# Patient Record
Sex: FEMALE | Race: WHITE | HISPANIC OR LATINO | Employment: FULL TIME | URBAN - METROPOLITAN AREA
[De-identification: names, ages, dates, MRNs, and addresses within clinical notes are randomized per-mention and may not be internally consistent; named-entity substitution may affect disease eponyms.]

---

## 2017-06-19 ENCOUNTER — ALLSCRIPTS OFFICE VISIT (OUTPATIENT)
Dept: OTHER | Facility: OTHER | Age: 42
End: 2017-06-19

## 2017-06-23 ENCOUNTER — ALLSCRIPTS OFFICE VISIT (OUTPATIENT)
Dept: OTHER | Facility: OTHER | Age: 42
End: 2017-06-23

## 2017-06-23 DIAGNOSIS — E55.9 VITAMIN D DEFICIENCY: ICD-10-CM

## 2017-06-23 DIAGNOSIS — Z00.00 ENCOUNTER FOR GENERAL ADULT MEDICAL EXAMINATION WITHOUT ABNORMAL FINDINGS: ICD-10-CM

## 2017-06-23 DIAGNOSIS — Z12.31 ENCOUNTER FOR SCREENING MAMMOGRAM FOR MALIGNANT NEOPLASM OF BREAST: ICD-10-CM

## 2017-08-23 DIAGNOSIS — N64.59 OTHER SIGN AND SYMPTOM IN BREAST: ICD-10-CM

## 2017-08-23 DIAGNOSIS — Z12.31 ENCOUNTER FOR SCREENING MAMMOGRAM FOR MALIGNANT NEOPLASM OF BREAST: ICD-10-CM

## 2017-08-23 DIAGNOSIS — Z11.3 ENCOUNTER FOR SCREENING FOR INFECTIONS WITH PREDOMINANTLY SEXUAL MODE OF TRANSMISSION: ICD-10-CM

## 2017-08-25 ENCOUNTER — HOSPITAL ENCOUNTER (OUTPATIENT)
Dept: MAMMOGRAPHY | Facility: CLINIC | Age: 42
Discharge: HOME | End: 2017-08-25

## 2017-08-25 ENCOUNTER — HOSPITAL ENCOUNTER (OUTPATIENT)
Dept: ULTRASOUND IMAGING | Facility: CLINIC | Age: 42
Discharge: HOME | End: 2017-08-25

## 2017-08-25 DIAGNOSIS — N64.59 OTHER SIGN AND SYMPTOM IN BREAST: ICD-10-CM

## 2017-08-25 DIAGNOSIS — Z12.31 ENCOUNTER FOR SCREENING MAMMOGRAM FOR MALIGNANT NEOPLASM OF BREAST: ICD-10-CM

## 2017-08-25 DIAGNOSIS — N63.0 BREAST LUMP: ICD-10-CM

## 2017-08-25 PROCEDURE — G0279 TOMOSYNTHESIS, MAMMO: HCPCS

## 2017-08-25 PROCEDURE — G0204 DX MAMMO INCL CAD BI: HCPCS

## 2017-08-25 PROCEDURE — 76642 ULTRASOUND BREAST LIMITED: CPT

## 2017-08-29 ENCOUNTER — LAB REQUISITION (OUTPATIENT)
Dept: LAB | Facility: HOSPITAL | Age: 42
End: 2017-08-29

## 2017-08-29 ENCOUNTER — GENERIC CONVERSION - ENCOUNTER (OUTPATIENT)
Dept: OTHER | Facility: OTHER | Age: 42
End: 2017-08-29

## 2017-08-29 DIAGNOSIS — Z11.3 ENCOUNTER FOR SCREENING FOR INFECTIONS WITH PREDOMINANTLY SEXUAL MODE OF TRANSMISSION: ICD-10-CM

## 2017-08-29 PROCEDURE — 87591 N.GONORRHOEAE DNA AMP PROB: CPT | Performed by: STUDENT IN AN ORGANIZED HEALTH CARE EDUCATION/TRAINING PROGRAM

## 2017-08-29 PROCEDURE — 87491 CHLMYD TRACH DNA AMP PROBE: CPT | Performed by: STUDENT IN AN ORGANIZED HEALTH CARE EDUCATION/TRAINING PROGRAM

## 2017-08-30 LAB
CHLAMYDIA DNA CVX QL NAA+PROBE: NORMAL
N GONORRHOEA DNA GENITAL QL NAA+PROBE: NORMAL

## 2018-01-11 NOTE — MISCELLANEOUS
Message   Recorded as Task   Date: 01/30/2016 03:32 PM, Created By: System   Task Name: Verify External Doc   Assigned To: Yessi Francisco   Regarding Patient: Etta Bello, Status: Active   Comment:    System - 30 Jan 2016 3:32 PM     To: Martha Johnson    Recipient DirectID: [email or web address]    From:     Sender DirectID: [email or web address]  Rebekah Bullek - 01 Feb 2016 12:20 PM     TASK REASSIGNED: Previously Assigned To Lori Nichols - 01 Feb 2016 4:06 PM     TASK EDITED  Received call from Faith Nguyen, Ritika Mead  Scheduled patient for consultation with TB in the [facility] office on Tuesday, 2/2/2016  Patient aware of office location  Patient aware to arrive early for registration/paperwork  Patient aware to bring   Received insurance referral for [facility] [employer]  Entered into [employer]  Active Problems    1  Birth control (V25 9) (Z30 9)   2  Cholelithiasis with cholangitis (574 20,576 1) (K80 20,K83 0)   3  Dermatitis (692 9) (L30 9)   4  Encounter for routine gynecological examination with Papanicolaou smear of cervix   (V72 31,V76 2) (Z01 419,Z12 4)   5  Encounter for screening mammogram for malignant neoplasm of breast (V76 12)   (Z12 31)    Current Meds   1  Altavera 0 15-30 MG-MCG Oral Tablet; TAKE 1 TABLET DAILY  Requested for:   01DDG6055; Last Rx:57Qcg2456 Ordered   2  Altavera 0 15-30 MG-MCG Oral Tablet; TAKE 1 TABLET DAILY; Therapy: 34NND6126 to (Evaluate:57Cjj8065)  Requested for: 40FDR9131; Last   Rx:34Nmj1569 Ordered    Allergies    1   No Known Drug Allergies    Signatures   Electronically signed by : Leeanne Dominguez, ; Feb 1 2016  4:06PM EST                       (Author)

## 2018-01-11 NOTE — PROGRESS NOTES
Assessment    1  Encounter for preventive health examination (V70 0) (Z00 00)   2  Vitamin D deficiency (268 9) (E55 9)   3  Blood tests for routine general physical examination (V72 62) (Z00 00)   4  Cervical cancer screening (V76 2) (Z12 4)   5  Encounter for screening mammogram for breast cancer ( 12) (Z12 31)    Plan   Blood tests for routine general physical examination    · (1) CBC/PLT/DIFF; Status:Active; Requested [id number];    · (1) COMPREHENSIVE METABOLIC PANEL; Status:Active; Requested BBJ:56GRT1946;    · (1) LIPID PANEL FASTING W DIRECT LDL REFLEX; Status:Active; Requested  SAX:79JTF0827;    · (1) T4, FREE; Status:Active; Requested OWB:05LJD1921;    · (1) TSH; Status:Active; Requested V42ACU1774;    · (1) URINALYSIS w URINE C/S REFLEX (will reflex a microscopy if leukocytes, occult  blood, or nitrites are not within normal limits); Status:Active; Requested QF78MJE4901;   Depression screening    · *VB - PHQ-9 Tool; Status:Complete;   Done: 74SJP0119 10:10AM  Health Maintenance    · Call [phone] if: You find a new or different kind of lump in your breast ;  Status:Complete;   Done: 65FDN0099   · Call [phone] if: You have any warning signs of skin cancer ; Status:Complete;    Done: 98GFM0972   · Always use a seat belt and shoulder strap when riding or driving a motor vehicle ;  Status:Complete;   Done: 01IHO5470   · Begin a limited exercise program ; Status:Complete;   Done: 11IJF3737   · Begin or continue regular aerobic exercise   Gradually work up to at least 3 sessions of 30  minutes of exercise a week ; Status:Complete;   Done: 13TQG1739   · Brush your teeth 3 times a day and floss at least once a day ; Status:Complete;   Done:  06DBL2582   · Decreasing the stress in your life may help your condition improve ; Status:Complete;    Done: 53ITB3843   · Drink plenty of fluids ; Status:Complete;   Done: 36OOQ9609   · Eat a low fat and low cholesterol diet ; Status:Complete; Done: 69SUH0433   · Eat a normal well-balanced diet ; Status:Complete;   Done: 51KDE2362   · Keep a diary of when and what you eat ; Status:Complete;   Done: 87SSQ2065   · Regular aerobic exercise can help reduce stress ; Status:Complete;   Done: 97QXB9671   · Some eating tips that can help you lose weight ; Status:Complete;   Done: 27YWF2523   · Stretch and warm up your muscles during the first 10 minutes , then cool down your  muscles for the last 10 minutes of exercise ; Status:Complete;   Done: 15CSI5241   · There are ways to decrease your stress and improve your sense of well-being  We  encourage you to keep active and exercise regularly  Make time to take care of yourself  and participate in activities that you enjoy  Stay connected to friends and family that can  support and comfort you  If at any time you have thoughts of harming yourself or  someone else, contact us immediately ; Status:Active; Requested ITC:04WMH6899;    · Use a sun block product with an SPF of 15 or more ; Status:Complete;   Done:  61SAP1552   · Vitamins can help you get daily requirements that your diet may not be giving you ;  Status:Complete;   Done: 95LPT2583   · We encourage all of our patients to exercise regularly  30 minutes of exercise or physical  activity five or more days a week is recommended for children and adults ;  Status:Complete;   Done: 79YXJ8915   · We encourage you to begin to make lifestyle changes to help control your blood  pressure    These may include losing weight, increasing your activity level, limiting salt in  your diet, decreasing alcohol intake, and eating a diet low in fat and rich in fruits  and vegetables ; Status:Complete;   Done: 95MWE6246   · We recommend routine visits to a dentist ; Status:Complete;   Done: 55FTF1721   · We recommend that you bring your body mass index down to 26 ; Status:Complete;    Done: 59YOU7791   · We recommend that you follow these steps to lower your risk of osteoporosis  ;  Status:Complete;   Done: 12XXE3352   · We want you to follow the Therapeutic Lifestyle Changes (TLC) diet ; Status:Complete;    Done: 07VFY4932  Vitamin D deficiency    · (1) VITAMIN D 25-HYDROXY; Status:Active; Requested FMP:70YIB8267;     *1 - [facility]  *  Status: Hold For - Scheduling  Requested for: 07BNC6211  Ordered; For: Cervical cancer screening;  Ordered By: Neel Abreu  Performed:   Due: 64SGM3726  * MAMMO SCREENING BILATERAL W CAD; Status:Hold For - Scheduling; Requested NKX:91FFF8038;   Perform:[facility]; EVH:29KDH8286; Ordered;    For:Encounter for screening mammogram for breast cancer; Ordered By:Chhaya Lopez;      Discussion/Summary  health maintenance visit healthy adult female Currently, she eats a healthy diet, has an inadequate exercise regimen and Encouraged to increase exercise to 5 days per week  Due for annual exam in August  Breast cancer screening: monthly self breast exam was advised, mammogram has been ordered and the next mammogram is due 8/2017  Colorectal cancer screening: colorectal cancer screening is not indicated  Osteoporosis screening: bone mineral density testing is not indicated  Screening lab work includes hemoglobin, glucose, lipid profile, thyroid function testing, 25-hydroxyvitamin D and urinalysis  The patient declines immunizations  She was advised to be evaluated by an optometrist and a dentist  Advice and education were given regarding nutrition, aerobic exercise, weight bearing exercise, calcium supplements, vitamin D supplements, reproductive health and cardiovascular risk reduction  Patient discussion: discussed with the patient  Complete lab testing  Discussed increasing exercise, including wt bearing for osteoporosis prevention  Consider taking OTC calcium supplement as well    Check with insurance regarding participating eye doctors and schedule exam   Schedule annual gyn and mammogram    The patient was counseled regarding instructions for management, risk factor reductions, patient and family education, impressions  The treatment plan was reviewed with the patient/guardian  The patient/guardian understands and agrees with the treatment plan      Chief Complaint  44 Y/O Pt wants blood work done  pt wants a referral for eye doctor  History of Present Illness  HM, Adult Female: The patient is being seen for a health maintenance evaluation  The last health maintenance visit was 1 year(s) ago  General Health: The patient's health since the last visit is described as good  She has regular dental visits  She complains of vision problems  Lifestyle:  She consumes a diverse and healthy diet  She has weight concerns  She exercises regularly  She exercises less than three times a week  Exercise includes walking  She does not use tobacco  She denies alcohol use  She denies drug use  Reproductive health:  she reports abnormal menses  she is not sexually active  Screening:   HPI: Here for well visit  States she needs to get her eyes checked  Not having any difficulty with distance vision, but having problems with reading and near vision  Otherwise feeling well  Would like to have blood testing done to check for any problems  Review of Systems    Constitutional: No fever, no chills, feels well, no tiredness, no recent weight gain or weight loss  Eyes: No complaints of eye pain, no red eyes, no eyesight problems, no discharge, no dry eyes, no itching of eyes  ENT: no complaints of earache, no loss of hearing, no nose bleeds, no nasal discharge, no sore throat, no hoarseness  Cardiovascular: No complaints of slow heart rate, no fast heart rate, no chest pain, no palpitations, no leg claudication, no lower extremity edema  Respiratory: No complaints of shortness of breath, no wheezing, no cough, no SOB on exertion, no orthopnea, no PND     Gastrointestinal: No complaints of abdominal pain, no constipation, no nausea or vomiting, no diarrhea, no bloody stools  Genitourinary: No complaints of dysuria, no incontinence, no pelvic pain, no dysmenorrhea, no vaginal discharge or bleeding  Musculoskeletal: No complaints of arthralgias, no myalgias, no joint swelling or stiffness, no limb pain or swelling  Integumentary: No complaints of skin rash or lesions, no itching, no skin wounds, no breast pain or lump  Neurological: No complaints of headache, no confusion, no convulsions, no numbness, no dizziness or fainting, no tingling, no limb weakness, no difficulty walking  Psychiatric: Not suicidal, no sleep disturbance, no anxiety or depression, no change in personality, no emotional problems  Endocrine: No complaints of proptosis, no hot flashes, no muscle weakness, no deepening of the voice, no feelings of weakness  Hematologic/Lymphatic: No complaints of swollen glands, no swollen glands in the neck, does not bleed easily, does not bruise easily  Over the past 2 weeks, how often have you been bothered by the following problems? 1 ) Little interest or pleasure in doing things? Several days  2 ) Feeling down, depressed or hopeless? Not at all    3 ) Trouble falling asleep or sleeping too much? Not at all    4 ) Feeling tired or having little energy? Several days  5 ) Poor appetite or overeating? Several days  6 ) Feeling bad about yourself, or that you are a failure, or have let yourself or your family down? Not at all    7 ) Trouble concentrating on things, such as reading a newspaper or watching television? Not at all    8 ) Moving or speaking so slowly that other people could have noticed, or the opposite, moving or speaking faster than usual? Not at all    9 ) Thoughts that you would be better off dead or of hurting yourself in some way? Not at all  Score 3      Active Problems    1  Birth control (V25 9) (Z30 9)   2  Dermatitis (692 9) (L30 9)   3   Encounter for routine gynecological examination with Papanicolaou smear of cervix   (V72 31,V76 2) (Z01 419)   4  Encounter for screening mammogram for malignant neoplasm of breast (V76 12)   (Z12 31)   5  Encounter for surveillance of contraceptive pills (V25 41) (Z30 41)   6  Screen for STD (sexually transmitted disease) (V74 5) (Z11 3)   7  Vitamin D deficiency (268 9) (E55 9)    Past Medical History    · History of Choledocholithiasis (574 50) (K80 50)   · History of Elevated liver enzymes (790 5) (R74 8)   · History of Known health problems: none    Surgical History    · History of  Section   · History of Cholecystectomy Laparoscopic   · History of Tubal Ligation    Family History  Mother    · Family history of malignant neoplasm of breast (V16 3) (Z80 3)   · Family history of Healthy adult  Father    · Family history of liver cancer (V16 0) (Z80 0)   · Family history of Healthy adult  Brother    · Family history of Healthy adult  Grandmother    · Family history of diabetes mellitus (V18 0) (Z83 3)    Social History    · Employed   · Has 3 children   · Never a smoker   · Never a smoker   · No alcohol use   · No drug use    Current Meds   1  Vitamin D3 2000 UNIT Oral Tablet; Take 1 tablet once daily; Therapy: 20DED7898 to (Last Rx:2016)  Requested for: 26LJI8339 Ordered    Allergies    1  No Known Drug Allergies    Vitals   Recorded: 37WLT7074 09:59AM   Temperature 96 F, Tympanic   Heart Rate 85   Respiration 18   Systolic 238   Diastolic 68   Height 5 ft 4 in   Weight 139 lb    BMI Calculated 23 86   BSA Calculated 1 68   O2 Saturation 99   LMP 28ULR2385     Physical Exam    Constitutional   General appearance: No acute distress, well appearing and well nourished  Eyes   Conjunctiva and lids: No swelling, erythema or discharge  Pupils and irises: Equal, round and reactive to light      Ears, Nose, Mouth, and Throat   External inspection of ears and nose: Normal     Otoscopic examination: Tympanic membranes translucent with normal light reflex  Canals patent without erythema  Oropharynx: Normal with no erythema, edema, exudate or lesions  Pulmonary   Respiratory effort: No increased work of breathing or signs of respiratory distress  Auscultation of lungs: Clear to auscultation  Cardiovascular   Palpation of heart: Normal PMI, no thrills  Auscultation of heart: Normal rate and rhythm, normal S1 and S2, without murmurs  Examination of extremities for edema and/or varicosities: Normal     Abdomen   Abdomen: Non-tender, no masses  Liver and spleen: No hepatomegaly or splenomegaly  Lymphatic   Palpation of lymph nodes in neck: No lymphadenopathy  Musculoskeletal   Gait and station: Normal     Digits and nails: Normal without clubbing or cyanosis  Inspection/palpation of joints, bones, and muscles: Normal     Skin   Skin and subcutaneous tissue: Normal without rashes or lesions  Neurologic   Cranial nerves: Cranial nerves 2-12 intact  Reflexes: 2+ and symmetric  Sensation: No sensory loss      Psychiatric   Orientation to person, place, and time: Normal     Mood and affect: Normal        Results/Data  *VB - PHQ-9 Tool 94KZA8036 10:10AM Francisco J Ulloa     Test Name Result Flag Reference   PHQ-9 Adult Depression Score 0     PHQ-9 Adult Depression Screening Negative         Signatures   Electronically signed by : MEMO Cullen; Jun 23 2017  1:20PM EST                       (Author)    Electronically signed by : SHIRLEY Chawla ; Jul 5 2017  2:38PM EST

## 2018-01-12 VITALS
RESPIRATION RATE: 18 BRPM | HEIGHT: 64 IN | SYSTOLIC BLOOD PRESSURE: 112 MMHG | TEMPERATURE: 96 F | BODY MASS INDEX: 23.73 KG/M2 | DIASTOLIC BLOOD PRESSURE: 68 MMHG | HEART RATE: 85 BPM | OXYGEN SATURATION: 99 % | WEIGHT: 139 LBS

## 2018-01-15 NOTE — MISCELLANEOUS
Provider Comments  Provider Comments:   patient no showed her 1040AM appointment today 06/19/2017      Signatures   Electronically signed by : Yun Cruz, 10 Michelle ; Jun 19 2017 11:04AM EST                       (Author)

## 2018-01-16 NOTE — RESULT NOTES
Verified Results  (1) CBC/PLT/DIFF 36FYU1015 09:43AM Gayla Segura Order Number: [id number]    TW Order Number: [id number]     Test Name Result Flag Reference   WBC COUNT 6 10 Thousand/uL  4 31-10 16   RBC COUNT 4 52 Million/uL  3 81-5 12   HEMOGLOBIN 13 1 g/dL  11 5-15 4   HEMATOCRIT 39 5 %  34 8-46  1   MCV 87 fL  82-98   MCH 29 0 pg  26 8-34 3   MCHC 33 2 g/dL  31 4-37 4   RDW 12 8 %  11 6-15 1   MPV 10 1 fL  8 9-12 7   PLATELET COUNT 225 Thousands/uL H 149-390   NEUTROPHILS RELATIVE PERCENT 52 %  43-75   LYMPHOCYTES RELATIVE PERCENT 33 %  14-44   MONOCYTES RELATIVE PERCENT 5 %  4-12   EOSINOPHILS RELATIVE PERCENT 8 % H 0-6   BASOPHILS RELATIVE PERCENT 2 % H 0-1   NEUTROPHILS ABSOLUTE COUNT 3 13 Thousands/µL  1 85-7 62   LYMPHOCYTES ABSOLUTE COUNT 2 04 Thousands/µL  0 60-4 47   MONOCYTES ABSOLUTE COUNT 0 31 Thousand/µL  0 17-1 22   EOSINOPHILS ABSOLUTE COUNT 0 49 Thousand/µL  0 00-0 61   BASOPHILS ABSOLUTE COUNT 0 13 Thousands/µL H 0 00-0 10     (1) COMPREHENSIVE METABOLIC PANEL 99YSH0984 76:65LA Arnie Born    Order Number: [id number]      National Kidney Disease Education Program recommendations are as follows:  GFR calculation is accurate only with a steady state creatinine  Chronic Kidney disease less than 60 ml/min/1 73 sq  meters  Kidney failure less than 15 ml/min/1 73 sq  meters  Test Name Result Flag Reference   GLUCOSE,RANDM 88 mg/dL     If the patient is fasting, the ADA then defines impaired fasting glucose as > 100 mg/dL and diabetes as > or equal to 123 mg/dL     SODIUM 139 mmol/L  136-145   POTASSIUM 3 9 mmol/L  3 5-5 3   CHLORIDE 102 mmol/L  100-108   CARBON DIOXIDE 28 mmol/L  21-32   ANION GAP (CALC) 9 mmol/L  4-13   BLOOD UREA NITROGEN 7 mg/dL  5-25   CREATININE 0 69 mg/dL  0 60-1 30   Standardized to IDMS reference method   CALCIUM 8 4 mg/dL  8 3-10 1   BILI, TOTAL 0 62 mg/dL  0 20-1 00   ALK PHOSPHATAS 90 U/L     ALT (SGPT) 24 U/L  12-78   AST(SGOT) 14 U/L  5-45 ALBUMIN 3 6 g/dL  3 5-5 0   TOTAL PROTEIN 7 8 g/dL  6 4-8 2   eGFR Non-African American      >60 0 ml/min/1 73sq m     (1) LIPID PANEL FASTING W DIRECT LDL REFLEX 21NJX8101 09:43AM [facility] Order Number: [id number]      Triglyceride:         Normal              <150 mg/dl       Borderline High    150-199 mg/dl       High               200-499 mg/dl       Very High          >499 mg/dl  Cholesterol:         Desirable        <200 mg/dl      Borderline High  200-239 mg/dl      High             >239 mg/dl  HDL Cholesterol:        High    >59 mg/dL      Low     <41 mg/dL  LDL Cholesterol:        Optimal          <100 mg/dl         Near Optimal     100-129 mg/dl        Above Optimal          Borderline High   130-159 mg/dl          High              160-189 mg/dl          Very High        >189 mg/dl  LDL CALCULATED:    This screening LDL is a calculated result  It does not have the accuracy of the Direct Measured LDL in the monitoring of patients with hyperlipidemia and/or statin therapy  Direct Measure LDL (XWI712) must be ordered separately in these patients  Test Name Result Flag Reference   CHOLESTEROL 135 mg/dL     LDL CHOLESTEROL CALCULATED 61 mg/dL  0-100   TRIGLYCERIDES 72 mg/dL  <=150   HDL,DIRECT 60 mg/dL  40-60     (1) TSH WITH FT4 REFLEX 99NOB6765 09:43AM [facility] Order Number: [id number]    Patients undergoing fluorescein dye angiography may retain small amounts of fluorescein in the body for 48-72 hours post procedure  Samples containing fluorescein can produce falsely depressed TSH values  If the patient had this procedure,a specimen should be resubmitted post fluorescein clearance          The recommended reference ranges for TSH during pregnancy are as follows:  First trimester 0 1 to 2 5 uIU/mL  Second trimester  0 2 to 3 0 uIU/mL  Third trimester 0 3 to 3 0 uIU/m     Test Name Result Flag Reference   TSH 0 809 uIU/mL  0 358-3 740     (1) VITAMIN B12 18ZSF8429 09:43AM Sherleen Alpers Order Number: [id number]     Test Name Result Flag Reference   VITAMIN B12 325 pg/mL  100-900     (1) HEMOGLOBIN A1C 31QMN7342 09:43AM Gwen Dupree   TW Order Number: [id number]      5 7-6 4% impaired fasting glucose  >=6 5% diagnosis of diabetes    Falsely low levels are seen in conditions linked to short RBC life span-  hemolytic anemia, and splenomegaly  Falsely elevated levels are seen in situations where there is an increased production of RBC- receipt of erythropoietin or blood transfusions  Adopted from ADA-Clinical Practice Recommendations     Test Name Result Flag Reference   HEMOGLOBIN A1C 5 3 %  4 0-5 6   EST  AVG  GLUCOSE 105 mg/dl       (1) VITAMIN D 25-HYDROXY 22QZG6653 09:43AM Gwen Dupree    Order Number: [id number]     Order Number: [id number]     Test Name Result Flag Reference   VIT D 25-HYDROX 10 1 ng/mL L 30 0-100 0       Plan  Vitamin D deficiency    · Vitamin D3 2000 UNIT Oral Tablet; Take 1 tablet once daily    Discussion/Summary   Patient to begin vitamin D3 supplement 2000units daily       Signatures   Electronically signed by : MEMO Knight; Mar 17 2016  2:08PM EST                       (Author)

## 2018-01-16 NOTE — PROGRESS NOTES
Assessment    1  Cholelithiasis with cholangitis (574 20,576 1) (K80 20,K83 0)    Plan  Cholelithiasis with cholangitis    · 1 - Rosalba CORREA, Shantelle Huertas  (General Surgery) Physician Referral  Consult  Status: Active   Requested for: 60TJR4005  Care Summary provided  : Yes    Discussion/Summary    Patient speaks Arabic  I did assist her with making an appointment with Dr Anita Gonzales  Her appt is scheduled for 2/2 at 10am  She was instructed to arrive 15 min  prior to appt time for paperwork completion and to bring an  with her, if possible  She does have lab reqs to have CMP and Acute hepatitis panel completed on 2/3  I did discuss the importance of avoiding fatty and spicy foods to avoid return of symptoms  Chief Complaint  follow up hospitalization for gallbladder pain      History of Present Illness  H[facility]: [facility]  #[id number]  Patient was seen for a new patient establishment of care on 1/27/16  She states that the following day, she awoke with extreme right upper quadrant pain with nausea and vomiting  She went to the ED and was found to have a stone in her CBD, which was removed with an ERCP  She was to have follow up lab testing on 2/3 for her elevated liver enzymes and to follow up with PCP in one week and to schedule an appt with a surgeon for gall stones  She came in for follow up today because she felt uncomfortable with the start of pain yesterday  She denies pain, nausea or vomiting today, but wants to see the surgeon as soon as possible to schedule surgery for gall bladder removal       Review of Systems    Constitutional: no fever and no chills  Gastrointestinal: as noted in HPI  Active Problems    1  Birth control (V25 9) (Z30 9)   2  Dermatitis (692 9) (L30 9)   3  Encounter for routine gynecological examination with Papanicolaou smear of cervix   (V72 31,V76 2) (Z01 419,Z12 4)   4   Encounter for screening mammogram for malignant neoplasm of breast (V76 12) (Z12 31)    Past Medical History    1  History of Known health problems: none    Family History    1  No pertinent family history    2  Family history of diabetes mellitus (V18 0) (Z83 3)    Social History    · Never a smoker   · Never a smoker   · No alcohol use   · No drug use    Surgical History    1  History of  Section    Current Meds   1  Altavera 0 15-30 MG-MCG Oral Tablet; TAKE 1 TABLET DAILY  Requested for:   60CIP5503; Last Rx:36Mmo1771 Ordered   2  Altavera 0 15-30 MG-MCG Oral Tablet; TAKE 1 TABLET DAILY; Therapy: 98UMN9518 to (Evaluate:84Mev7554)  Requested for: 05FWM4505; Last   Rx:2016 Ordered    Allergies    1  No Known Drug Allergies    Vitals   Recorded: 97TRG2375 11:09AM   Temperature 96 9 F   Heart Rate 94   Respiration 18   Systolic 671   Diastolic 80   Height 5 ft 4 in   Weight 136 lb    BMI Calculated 23 34   BSA Calculated 1 66   O2 Saturation 99     Physical Exam    Constitutional   General appearance: No acute distress, well appearing and well nourished  Eyes   Conjunctiva and lids: No swelling, erythema or discharge  Abdomen   Abdomen: Non-tender, no masses  The abdomen was flat  There was mild tenderness in the right upper quadrant  Skin   Skin and subcutaneous tissue: Normal without rashes or lesions      Psychiatric   Mood and affect: Normal          Future Appointments    Date/Time Provider Specialty Site   2016 10:00 AM Brooklyn Haile [facility] General Surgery [facility]     Signatures   Electronically signed by : Villa Sanz, [address] [place] [address]; 2016  1:24PM EST                       (Author)    Electronically signed by : SHIRLEY Cervantes ; 2016  4:14PM EST

## 2018-01-17 NOTE — CONSULTS
Assessment    1  Choledocholithiasis (574 50) (K80 50)   2  Elevated liver enzymes (790 5) (R74 8)    Plan  Cholelithiasis with cholangitis    · Colace 100 MG Oral Capsule; TAKE 1 CAPSULE TWICE DAILY   Rx By: Heaven Roberson; Dispense: 15 Days ; #:30 Capsule; Refill: 0; For: Cholelithiasis with cholangitis; NO = N; Print Rx; Last Updated By: Kandi Martin; 2/2/2016 11:18:57 AM   · Ondansetron 4 MG Oral Tablet Dispersible; take 1 tablet every 6 hours as needed  for nausea   Rx By: Heaven Roberson; Dispense: 0 Days ; #:10 Tablet Dispersible; Refill: 0; For: Cholelithiasis with cholangitis; NO = N; Print Rx; Last Updated By: Kandi Martin; 2/2/2016 11:18:56 AM    Discussion/Summary  Discussion Summary:   *****51-year-old female with recent admission and ERCP for choledocholithiasis  Gallstones positive on ultrasound  Inpatient total bilirubin as high as 2 7 and 2 3 prior to discharge from the hospital     1  Choledocholithiasis status post ERCP by gastroenterology  Recheck CMP to document resolution of transaminitis  2  Cholelithiasis on ultrasound  Laparoscopic cholecystectomy in 4 weeks once the inflammation has subsided from recent attack  Chief Complaint  Chief Complaint Free Text Note Form: Patient is here today for a hospital follow up  She was admitted through the ER for cholelithiasis  She had an ERCP with sphincterotomy on 1/29/16  fever/chills - denies   nausea/vomiting - denies   pain - RUQ 2/10  bowels - regular pattern, no blood or melena  studies - CMP and hepatitis profile will be done 2/3/16  History of Present Illness  HPI: 51-year-old female presents to our office  [place]-speaking  Accompanied by a family member who serves as her   Recent hospitalization for right upper quadrant pain and transaminitis choledocholithiasis and cholelithiasis  ERCP with sphincterotomy by gastroenterology      She was admitted on January 28, 2016 with choledocholithiasis and transaminitis  Her total bilirubin was as high as 2 7 and was 2 3 prior to discharge  She did have an ERCP with enterotomy or choledocholithiasis  Gallbladder ultrasound showed multiple gallstones and a common bile duct measuring 7 mm  She has been feeling well since discharge from the hospital  Eating and drinking without difficulty  Mild twinges of right upper quadrant pain  No nausea no vomiting  Prior abdominal surgery positive for  section  Denies any prior reaction to anesthesia  Nonsmoker  Has 2 young children ages 3 and 1  Review of Systems  Complete-Female:   Constitutional: no fever and no chills  Eyes: no eye pain  ENT: no earache  Cardiovascular: no chest pain and no palpitations  Respiratory: no shortness of breath  Gastrointestinal: no abdominal pain  Genitourinary: no dysuria  Musculoskeletal: no arthralgias  Integumentary: no rashes  Neurological: no headache  Psychiatric: not suicidal    Endocrine: no proptosis  Hematologic/Lymphatic: no swollen glands  Active Problems    1  Birth control (V25 9) (Z30 9)   2  Dermatitis (692 9) (L30 9)   3  Encounter for routine gynecological examination with Papanicolaou smear of cervix   (V72 31,V76 2) (Z01 419,Z12 4)   4  Encounter for screening mammogram for malignant neoplasm of breast (V76 12)   (Z12 31)    Past Medical History    1  History of Known health problems: none  Active Problems And Past Medical History Reviewed: The active problems and past medical history were reviewed and updated today  Surgical History    1  History of  Section  Surgical History Reviewed: The surgical history was reviewed and updated today  Family History    1  Family history of Healthy adult    2  Family history of Healthy adult    3  Family history of Healthy adult    4  Family history of diabetes mellitus (V18 0) (Z83 3)  Family History Reviewed: The family history was reviewed and updated today  Social History    · Never a smoker   · Never a smoker   · No alcohol use   · No drug use  Social History Reviewed: The social history was reviewed and updated today  Current Meds   1  Altavera 0 15-30 MG-MCG Oral Tablet; TAKE 1 TABLET DAILY  Requested for:   31THX2225; Last Rx:[id number] Ordered   2  Altavera 0 15-30 MG-MCG Oral Tablet; TAKE 1 TABLET DAILY; Therapy: 54JCL2184 to (Evaluate:17Rkb6334)  Requested for: 49BZS7457; Last   Rx:[id number] Ordered  Medication List Reviewed: The medication list was reviewed and updated today  Allergies    1  No Known Drug Allergies    Vitals  Vital Signs [Data Includes: Current Encounter]    Recorded: 02Feb2016 10:48AM   Temperature 97 5 F, Tympanic   Heart Rate 76, L Radial   Pulse Quality Regular, L Radial   Respiration 16   Systolic 404, LUE, Sitting   Diastolic 70, LUE, Sitting   Height 5 ft 4 in   Weight 137 lb 3 2 oz   BMI Calculated 23 55   BSA Calculated 1 67     Physical Exam    Constitutional   General appearance: No acute distress, well appearing and well nourished  Eyes   Conjunctiva and lids: No swelling, erythema or discharge  Pupils and irises: Equal, round and reactive to light  Sclera non-icteric  Ears, Nose, Mouth, and Throat   External inspection of ears and nose: Normal     Neck   Supple, symmetric, trachea midline, no masses   Pulmonary   Respiratory effort: No increased work of breathing or signs of respiratory distress  Auscultation of lungs: Clear to auscultation, equal breath sounds bilaterally, no wheezes, no rales, no rhonci  Cardiovascular   Auscultation of heart: Normal rate and rhythm, normal S1 and S2, without murmurs  Abdomen   Abdomen: Non-tender, no masses  Liver and spleen: No hepatomegaly or splenomegaly  Lymphatic   Palpation of lymph nodes in neck: No lymphadenopathy  Musculoskeletal   Gait and station: Normal     Skin   Skin and subcutaneous tissue: Normal without rashes or lesions      Neurologic Cranial nerves: Cranial nerves 2-12 intact  Psychiatric   Orientation to person, place, and time: Normal     Mood and affect: Normal           Results/Data  Diagnostic Studies Reviewed: I personally reviewed the films/images/results in the office today  My interpretation follows  Ultrasound Gallbladder ultrasound reviewed  ERCP report reviewed  Diagnostic Review Admission documentation  Discharge documentation  Lab work reviewed  Provider Comments  Provider Comments: This report has been generated by a voice recognition software system  Therefore, there may be syntax, spelling, and/or grammatical errors  Please call if you've any questions  Extensive discussion was carried out with the patient regarding cholecystectomy  We had a discussion regarding the risks, benefits, and alternatives  Discussion included possible medical therapy, which is generally not successful  We specifically discussed the risk of bile duct injury, bile duct leak, bleeding, injury to other internal organs, conversion to an open operation as a matter of safety, and Dr Calvillo Bile complication rates  We discussed that bile duct injury would result in significant additional postoperative surgery and/or reconstruction, which might be done in a different institution  There is a small chance of retained stone the common bile duct or liver in the future  We discussed the long-term complication rates of these procedures  Patient was given preoperative instructions  Patient was instructed to take their hypertension medications prior to surgery, stop any blood thinners, and modulate their diabetes medications as per their primary care physician's instructions  They understand the risks and benefits and agreed to the plan  They were given the opportunity to ask questions  All questions are answered at the end of our discussion       Due to the review of outside records, ultrasound, ERCP note, and history being obtained from a family member, and the need for elective major surgery, medical decision-making determined to be high for this visit        Future Appointments    Date/Time Provider Specialty Site   03/08/2016 10:00 AM Nj Valdovinos, [facility] General Surgery [facility]     Signatures   Electronically signed by : Juani Weller [facility]; Feb 2 2016  1:10PM EST                       (Author)    Electronically signed by : Omar Martinez MD; Feb 4 2016  4:11PM EST

## 2018-01-18 NOTE — MISCELLANEOUS
Message      Recorded as Task   Date: 02/25/2016 08:56 AM, Created By: Tony Crocker   Task Name: Follow Up   Assigned To: [facility],Team   Regarding Patient: Ramu Camp, Status: Active   Comment:    Tony Crocker - 25 Feb 2016 8:56 AM     TASK CREATED  Caller: Fede Magdaleno, Other; Other  Routine post op call placed to patient  She had a laparoscopic cholecystectomy with intraoperative cholangiogram on 2/24/16  She had no problems to report or qquestions at athe time of the call  Her post op appointment is scheduled on 3/8/16 @ 1000 with LIZETTE Hackett  She will call back for any questions or concerns  Pathology is pending  Erika Galvez - 04 Mar 2016 4:16 PM     TASK EDITED  Pathology resulted and reviewed by DR Doris Tadeo  Active Problems    1  Birth control (V25 9) (Z30 9)   2  Choledocholithiasis (574 50) (K80 50)   3  Dermatitis (692 9) (L30 9)   4  Elevated liver enzymes (790 5) (R74 8)   5  Encounter for routine gynecological examination with Papanicolaou smear of cervix   (V72 31,V76 2) (Z01 419)   6  Encounter for screening mammogram for malignant neoplasm of breast (V76 12)   (Z12 31)    Current Meds   1  Altavera 0 15-30 MG-MCG Oral Tablet; TAKE 1 TABLET DAILY  Requested for:   17KFF3653; Last Rx:[id number] Ordered   2  Altavera 0 15-30 MG-MCG Oral Tablet; TAKE 1 TABLET DAILY; Therapy: 21QSE3101 to (Evaluate:46Pzw0194)  Requested for: 42LVD6973; Last   Rx:[id number] Ordered   3  Colace 100 MG Oral Capsule; TAKE 1 CAPSULE TWICE DAILY; Therapy: 34IXP3429 to (Evaluate:08Dkj7199); Last Rx:[id number] Ordered   4  Ondansetron 4 MG Oral Tablet Dispersible; take 1 tablet every 6 hours as needed for   nausea; Therapy: 21JAJ9544 to (Last Rx:75Rza1744) Ordered    Allergies    1   No Known Drug Allergies    Signatures   Electronically signed by : Fede Magdaleno, ; Mar  4 2016  4:16PM EST                       (Author)

## 2018-01-22 ENCOUNTER — ALLSCRIPTS OFFICE VISIT (OUTPATIENT)
Dept: OTHER | Facility: OTHER | Age: 43
End: 2018-01-22

## 2018-01-22 VITALS
BODY MASS INDEX: 23.66 KG/M2 | HEIGHT: 64 IN | DIASTOLIC BLOOD PRESSURE: 71 MMHG | SYSTOLIC BLOOD PRESSURE: 110 MMHG | WEIGHT: 138.6 LBS

## 2018-01-24 NOTE — MISCELLANEOUS
Provider Comments  Provider Comments:   pt no showed 10am appt on 1/22/18      Signatures   Electronically signed by : Ritika Farley; Jan 22 2018 10:47AM EST                       (Author)

## 2018-03-26 ENCOUNTER — OFFICE VISIT (OUTPATIENT)
Dept: FAMILY MEDICINE CLINIC | Facility: CLINIC | Age: 43
End: 2018-03-26
Payer: COMMERCIAL

## 2018-03-26 VITALS
RESPIRATION RATE: 18 BRPM | SYSTOLIC BLOOD PRESSURE: 102 MMHG | HEART RATE: 101 BPM | OXYGEN SATURATION: 97 % | HEIGHT: 64 IN | BODY MASS INDEX: 23.22 KG/M2 | WEIGHT: 136 LBS | TEMPERATURE: 96.9 F | DIASTOLIC BLOOD PRESSURE: 70 MMHG

## 2018-03-26 DIAGNOSIS — R42 DIZZINESS: ICD-10-CM

## 2018-03-26 DIAGNOSIS — Z12.4 PAP SMEAR FOR CERVICAL CANCER SCREENING: Primary | ICD-10-CM

## 2018-03-26 PROCEDURE — 1036F TOBACCO NON-USER: CPT | Performed by: FAMILY MEDICINE

## 2018-03-26 PROCEDURE — 99213 OFFICE O/P EST LOW 20 MIN: CPT | Performed by: FAMILY MEDICINE

## 2018-03-26 PROCEDURE — 3008F BODY MASS INDEX DOCD: CPT | Performed by: FAMILY MEDICINE

## 2018-03-26 RX ORDER — LEVONORGESTREL AND ETHINYL ESTRADIOL 0.15-0.03
1 KIT ORAL DAILY
COMMUNITY
Start: 2016-01-06 | End: 2018-03-26 | Stop reason: ALTCHOICE

## 2018-03-26 NOTE — PROGRESS NOTES
Assessment/Plan:    No problem-specific Assessment & Plan notes found for this encounter  Diagnoses and all orders for this visit:    Pap smear for cervical cancer screening  -     Ambulatory referral to Obstetrics / Gynecology; Future    Dizziness    Other orders  -     Discontinue: levonorgestrel-ethinyl estradiol (NORDETTE) 0 15-30 MG-MCG per tablet; Take 1 tablet by mouth daily     Reprinted lab work orders from her last PE to include CBC, TSH, free T4, HIV, RPR, CMP, lipid panel  Will contact patient with lab results  If everything is normal, plan to refer her to plastic surgery for evaluation of lower abdominal pooch  Refer to GYN for routine pap- will continue OCPs through them  Subjective:      Patient ID: Geoff Ricketts is a 37 y o  female  37year old presents for a check for "everything "  She would like to consider plastic surgery for a lower abdominal bulge  She wants to make sure everything is okay before she does this  She had PE 8/2017 and labwork was ordered but never went to get this done  She complains of occasional dizziness  She has a bad episode of dizziness about 3 months ago when she couldn't get out of bed  Hasn't happened since  She does have a history of anemia and wants to be sure this hasn't returned  The following portions of the patient's history were reviewed and updated as appropriate: allergies, current medications, past medical history, past surgical history and problem list     Review of Systems   Constitutional: Negative for activity change, appetite change, chills, diaphoresis, fatigue, fever and unexpected weight change  HENT: Negative for congestion, dental problem, ear discharge, ear pain, hearing loss, mouth sores, nosebleeds, postnasal drip, rhinorrhea, sinus pain, sinus pressure, sneezing and sore throat  Eyes: Negative for pain, discharge, redness, itching and visual disturbance     Respiratory: Negative for cough, chest tightness, shortness of breath and wheezing  Cardiovascular: Negative for chest pain, palpitations and leg swelling  Gastrointestinal: Negative for abdominal pain, blood in stool, constipation, diarrhea, nausea and vomiting  Genitourinary: Negative for difficulty urinating, dysuria, frequency, hematuria and urgency  Musculoskeletal: Negative for arthralgias, joint swelling, myalgias and neck pain  Skin: Negative for rash  Neurological: Positive for dizziness  Negative for seizures, numbness and headaches  See HPI  This was one day of the room spinning about 3 months ago  No similar sx since  Hematological: Negative for adenopathy  Does not bruise/bleed easily  Psychiatric/Behavioral: Negative for behavioral problems, dysphoric mood, self-injury, sleep disturbance and suicidal ideas  The patient is not nervous/anxious  Objective:      /70 (BP Location: Right arm, Patient Position: Sitting, Cuff Size: Standard)   Pulse 101   Temp (!) 96 9 °F (36 1 °C) (Tympanic)   Resp 18   Ht 5' 3 5" (1 613 m)   Wt 61 7 kg (136 lb)   LMP 03/15/2018   SpO2 97%   BMI 23 71 kg/m²          Physical Exam   Constitutional: She is oriented to person, place, and time  She appears well-developed and well-nourished  No distress  HENT:   Head: Normocephalic and atraumatic  Right Ear: External ear normal    Left Ear: External ear normal    Nose: Nose normal    Mouth/Throat: Oropharynx is clear and moist  No oropharyngeal exudate  Eyes: Conjunctivae and EOM are normal  Pupils are equal, round, and reactive to light  Right eye exhibits no discharge  Left eye exhibits no discharge  No scleral icterus  Neck: Normal range of motion  Neck supple  No JVD present  No tracheal deviation present  No thyromegaly present  Cardiovascular: Normal rate, regular rhythm, normal heart sounds and intact distal pulses  Exam reveals no gallop and no friction rub  No murmur heard    Pulmonary/Chest: Effort normal and breath sounds normal  No stridor  No respiratory distress  She has no wheezes  She has no rales  She exhibits no tenderness  Abdominal: Soft  Bowel sounds are normal  She exhibits no distension and no mass  There is no tenderness  There is no rebound and no guarding  Pooching of lower abdominal skin   Musculoskeletal: She exhibits no edema, tenderness or deformity  Cervical back: Normal         Thoracic back: Normal         Lumbar back: Normal    Lymphadenopathy:     She has no cervical adenopathy  Neurological: She is alert and oriented to person, place, and time  She has normal reflexes  No cranial nerve deficit  She exhibits normal muscle tone  Coordination normal    Skin: Skin is warm and dry  No rash noted  She is not diaphoretic  No erythema  Psychiatric: She has a normal mood and affect  Her behavior is normal  Judgment and thought content normal    Nursing note and vitals reviewed

## 2018-03-27 ENCOUNTER — OFFICE VISIT (OUTPATIENT)
Dept: OBGYN CLINIC | Facility: CLINIC | Age: 43
End: 2018-03-27
Payer: COMMERCIAL

## 2018-03-27 VITALS — DIASTOLIC BLOOD PRESSURE: 73 MMHG | BODY MASS INDEX: 23.36 KG/M2 | WEIGHT: 134 LBS | SYSTOLIC BLOOD PRESSURE: 123 MMHG

## 2018-03-27 DIAGNOSIS — B37.9 YEAST INFECTION: Primary | ICD-10-CM

## 2018-03-27 LAB
BV WHIFF TEST VAG QL: NEGATIVE
CLUE CELLS SPEC QL WET PREP: NEGATIVE
PH SMN: 4.5 [PH]
SL AMB POCT WET MOUNT: ABNORMAL
T VAGINALIS RRNA SPEC QL NAA+PROBE: NEGATIVE
YEAST VAG QL WET PREP: POSITIVE

## 2018-03-27 PROCEDURE — 87210 SMEAR WET MOUNT SALINE/INK: CPT | Performed by: NURSE PRACTITIONER

## 2018-03-27 PROCEDURE — 99214 OFFICE O/P EST MOD 30 MIN: CPT | Performed by: NURSE PRACTITIONER

## 2018-03-27 NOTE — PATIENT INSTRUCTIONS
Use Monista vaginally x 7 nights  Wear cotton underwear and loose clothes  Return for annual exam last was 2016

## 2018-03-27 NOTE — PROGRESS NOTES
Assessment/Plan:    No problem-specific Assessment & Plan notes found for this encounter  Diagnoses and all orders for this visit:    Yeast infection  -     POCT wet mount  -     miconazole (MONISTAT 7) 100 mg vaginal suppository; Insert 1 suppository (100 mg total) into the vagina daily at bedtime for 7 days      Plan  Use Monista vaginally x 7 nights  Wear cotton underwear and loose clothes  Return for annual exam last was 2016  Pt verbalized understanding of all discussed  Subjective:      Patient ID: Junaid Marquez is a 37 y o  female  HPI   Pt has not been with her spouse  6 months, they had been   Pt states they always used condoms  States they got back together 3 days ago, had intercourse with a condom and now she has discharge which itches  Explained wet mount was positive for yeast, safe and effective use of Monistat 7 provided   Comfort measures discussed    The following portions of the patient's history were reviewed and updated as appropriate: allergies, current medications, past family history, past medical history, past social history, past surgical history and problem list     Review of Systems      Negative except for vaginal discharge which itches, pt denies pain    Objective:      /73   Wt 60 8 kg (134 lb)   LMP 03/15/2018   BMI 23 36 kg/m²          Physical Exam      Alert and orient  Vulv negative lesion, slight erythema  Vagina moderate amount of thick white discharge, positive erythema  Cervix negative lesions, positive thick white discharge  Wet mount positive for yeast

## 2018-03-29 ENCOUNTER — APPOINTMENT (OUTPATIENT)
Dept: LAB | Facility: HOSPITAL | Age: 43
End: 2018-03-29
Payer: COMMERCIAL

## 2018-03-29 DIAGNOSIS — Z11.3 ENCOUNTER FOR SCREENING FOR INFECTIONS WITH PREDOMINANTLY SEXUAL MODE OF TRANSMISSION: ICD-10-CM

## 2018-03-29 DIAGNOSIS — Z00.00 ENCOUNTER FOR GENERAL ADULT MEDICAL EXAMINATION WITHOUT ABNORMAL FINDINGS: ICD-10-CM

## 2018-03-29 DIAGNOSIS — E55.9 VITAMIN D DEFICIENCY: ICD-10-CM

## 2018-03-29 LAB
25(OH)D3 SERPL-MCNC: 10.8 NG/ML (ref 30–100)
ALBUMIN SERPL BCP-MCNC: 3.7 G/DL (ref 3.5–5)
ALP SERPL-CCNC: 68 U/L (ref 46–116)
ALT SERPL W P-5'-P-CCNC: 28 U/L (ref 12–78)
ANION GAP SERPL CALCULATED.3IONS-SCNC: 11 MMOL/L (ref 4–13)
AST SERPL W P-5'-P-CCNC: 17 U/L (ref 5–45)
BACTERIA UR QL AUTO: ABNORMAL /HPF
BASOPHILS # BLD AUTO: 0.06 THOUSANDS/ΜL (ref 0–0.1)
BASOPHILS NFR BLD AUTO: 1 % (ref 0–1)
BILIRUB SERPL-MCNC: 0.4 MG/DL (ref 0.2–1)
BILIRUB UR QL STRIP: NEGATIVE
BUN SERPL-MCNC: 9 MG/DL (ref 5–25)
CALCIUM SERPL-MCNC: 9.2 MG/DL (ref 8.3–10.1)
CHLORIDE SERPL-SCNC: 101 MMOL/L (ref 100–108)
CHOLEST SERPL-MCNC: 163 MG/DL (ref 50–200)
CLARITY UR: CLEAR
CO2 SERPL-SCNC: 26 MMOL/L (ref 21–32)
COLOR UR: YELLOW
CREAT SERPL-MCNC: 0.76 MG/DL (ref 0.6–1.3)
EOSINOPHIL # BLD AUTO: 0.38 THOUSAND/ΜL (ref 0–0.61)
EOSINOPHIL NFR BLD AUTO: 7 % (ref 0–6)
ERYTHROCYTE [DISTWIDTH] IN BLOOD BY AUTOMATED COUNT: 13.3 % (ref 11.6–15.1)
GFR SERPL CREATININE-BSD FRML MDRD: 111 ML/MIN/1.73SQ M
GLUCOSE P FAST SERPL-MCNC: 89 MG/DL (ref 65–99)
GLUCOSE UR STRIP-MCNC: NEGATIVE MG/DL
HBV SURFACE AG SER QL: NORMAL
HCT VFR BLD AUTO: 42.5 % (ref 34.8–46.1)
HDLC SERPL-MCNC: 59 MG/DL (ref 40–60)
HGB BLD-MCNC: 14.3 G/DL (ref 11.5–15.4)
HGB UR QL STRIP.AUTO: NORMAL
KETONES UR STRIP-MCNC: NEGATIVE MG/DL
LDLC SERPL CALC-MCNC: 86 MG/DL (ref 0–100)
LEUKOCYTE ESTERASE UR QL STRIP: NEGATIVE
LYMPHOCYTES # BLD AUTO: 1.81 THOUSANDS/ΜL (ref 0.6–4.47)
LYMPHOCYTES NFR BLD AUTO: 34 % (ref 14–44)
MCH RBC QN AUTO: 30.1 PG (ref 26.8–34.3)
MCHC RBC AUTO-ENTMCNC: 33.6 G/DL (ref 31.4–37.4)
MCV RBC AUTO: 90 FL (ref 82–98)
MONOCYTES # BLD AUTO: 0.26 THOUSAND/ΜL (ref 0.17–1.22)
MONOCYTES NFR BLD AUTO: 5 % (ref 4–12)
NEUTROPHILS # BLD AUTO: 2.78 THOUSANDS/ΜL (ref 1.85–7.62)
NEUTS SEG NFR BLD AUTO: 53 % (ref 43–75)
NITRITE UR QL STRIP: NEGATIVE
NON-SQ EPI CELLS URNS QL MICRO: ABNORMAL /HPF
NRBC BLD AUTO-RTO: 0 /100 WBCS
PH UR STRIP.AUTO: 6 [PH] (ref 4.5–8)
PLATELET # BLD AUTO: 338 THOUSANDS/UL (ref 149–390)
PMV BLD AUTO: 10.4 FL (ref 8.9–12.7)
POTASSIUM SERPL-SCNC: 3.6 MMOL/L (ref 3.5–5.3)
PROT SERPL-MCNC: 8.1 G/DL (ref 6.4–8.2)
PROT UR STRIP-MCNC: NEGATIVE MG/DL
RBC # BLD AUTO: 4.75 MILLION/UL (ref 3.81–5.12)
RBC #/AREA URNS AUTO: ABNORMAL /HPF
RPR SER QL: NORMAL
SODIUM SERPL-SCNC: 138 MMOL/L (ref 136–145)
SP GR UR STRIP.AUTO: 1.02 (ref 1–1.03)
T4 FREE SERPL-MCNC: 1.04 NG/DL (ref 0.76–1.46)
TRIGL SERPL-MCNC: 90 MG/DL
TSH SERPL DL<=0.05 MIU/L-ACNC: 2.07 UIU/ML (ref 0.36–3.74)
UROBILINOGEN UR QL STRIP.AUTO: 0.2 E.U./DL
WBC # BLD AUTO: 5.29 THOUSAND/UL (ref 4.31–10.16)
WBC #/AREA URNS AUTO: ABNORMAL /HPF

## 2018-03-29 PROCEDURE — 82306 VITAMIN D 25 HYDROXY: CPT

## 2018-03-29 PROCEDURE — 87340 HEPATITIS B SURFACE AG IA: CPT

## 2018-03-29 PROCEDURE — 36415 COLL VENOUS BLD VENIPUNCTURE: CPT

## 2018-03-29 PROCEDURE — 86592 SYPHILIS TEST NON-TREP QUAL: CPT

## 2018-03-29 PROCEDURE — 85025 COMPLETE CBC W/AUTO DIFF WBC: CPT

## 2018-03-29 PROCEDURE — 81001 URINALYSIS AUTO W/SCOPE: CPT

## 2018-03-29 PROCEDURE — 80053 COMPREHEN METABOLIC PANEL: CPT

## 2018-03-29 PROCEDURE — 80061 LIPID PANEL: CPT

## 2018-03-29 PROCEDURE — 87389 HIV-1 AG W/HIV-1&-2 AB AG IA: CPT

## 2018-03-29 PROCEDURE — 84443 ASSAY THYROID STIM HORMONE: CPT

## 2018-03-29 PROCEDURE — 84439 ASSAY OF FREE THYROXINE: CPT

## 2018-03-30 LAB — HIV 1+2 AB+HIV1 P24 AG SERPL QL IA: NORMAL

## 2018-04-23 ENCOUNTER — TELEPHONE (OUTPATIENT)
Dept: FAMILY MEDICINE CLINIC | Facility: CLINIC | Age: 43
End: 2018-04-23

## 2018-04-23 DIAGNOSIS — E55.9 VITAMIN D DEFICIENCY: Primary | ICD-10-CM

## 2018-04-23 RX ORDER — ERGOCALCIFEROL 1.25 MG/1
50000 CAPSULE ORAL WEEKLY
Qty: 12 CAPSULE | Refills: 1 | Status: CANCELLED | OUTPATIENT
Start: 2018-04-23

## 2018-05-04 DIAGNOSIS — E55.9 VITAMIN D DEFICIENCY: Primary | ICD-10-CM

## 2018-05-04 RX ORDER — ERGOCALCIFEROL 1.25 MG/1
50000 CAPSULE ORAL WEEKLY
Qty: 12 CAPSULE | Refills: 1 | Status: SHIPPED | OUTPATIENT
Start: 2018-05-04 | End: 2019-08-01 | Stop reason: ALTCHOICE

## 2018-08-03 ENCOUNTER — TELEPHONE (OUTPATIENT)
Dept: FAMILY MEDICINE CLINIC | Facility: CLINIC | Age: 43
End: 2018-08-03

## 2018-08-03 DIAGNOSIS — Z12.39 BREAST CANCER SCREENING: Primary | ICD-10-CM

## 2018-08-03 NOTE — TELEPHONE ENCOUNTER
Pt called for a script to be put in the system for a diagnostic mammogram at the breast center in [place]    Fax to : [phone]

## 2018-08-06 ENCOUNTER — HOSPITAL ENCOUNTER (OUTPATIENT)
Dept: MAMMOGRAPHY | Facility: CLINIC | Age: 43
Discharge: HOME | End: 2018-08-06
Payer: COMMERCIAL

## 2018-08-06 DIAGNOSIS — Z12.31 ENCOUNTER FOR SCREENING MAMMOGRAM FOR MALIGNANT NEOPLASM OF BREAST: ICD-10-CM

## 2018-08-06 PROCEDURE — 77067 SCR MAMMO BI INCL CAD: CPT

## 2018-08-06 PROCEDURE — 77063 BREAST TOMOSYNTHESIS BI: CPT

## 2019-03-03 RX ORDER — LEVONORGESTREL AND ETHINYL ESTRADIOL 0.15-0.03
KIT ORAL
Qty: 84 TABLET | Refills: 0 | OUTPATIENT
Start: 2019-03-03

## 2019-03-28 ENCOUNTER — OFFICE VISIT (OUTPATIENT)
Dept: OBGYN CLINIC | Facility: CLINIC | Age: 44
End: 2019-03-28

## 2019-03-28 VITALS
HEIGHT: 63 IN | DIASTOLIC BLOOD PRESSURE: 70 MMHG | WEIGHT: 133 LBS | BODY MASS INDEX: 23.57 KG/M2 | HEART RATE: 96 BPM | SYSTOLIC BLOOD PRESSURE: 109 MMHG

## 2019-03-28 DIAGNOSIS — Z01.419 ENCOUNTER FOR ANNUAL ROUTINE GYNECOLOGICAL EXAMINATION: Primary | ICD-10-CM

## 2019-03-28 DIAGNOSIS — Z12.31 ENCOUNTER FOR SCREENING MAMMOGRAM FOR BREAST CANCER: ICD-10-CM

## 2019-03-28 DIAGNOSIS — Z30.41 ENCOUNTER FOR SURVEILLANCE OF CONTRACEPTIVE PILLS: ICD-10-CM

## 2019-03-28 PROCEDURE — 87624 HPV HI-RISK TYP POOLED RSLT: CPT | Performed by: NURSE PRACTITIONER

## 2019-03-28 PROCEDURE — 99396 PREV VISIT EST AGE 40-64: CPT | Performed by: NURSE PRACTITIONER

## 2019-03-28 PROCEDURE — G0145 SCR C/V CYTO,THINLAYER,RESCR: HCPCS | Performed by: NURSE PRACTITIONER

## 2019-03-28 RX ORDER — LEVONORGESTREL AND ETHINYL ESTRADIOL 0.15-0.03
1 KIT ORAL DAILY
Qty: 28 TABLET | Refills: 11 | Status: SHIPPED | OUTPATIENT
Start: 2019-03-28 | End: 2019-08-01 | Stop reason: ALTCHOICE

## 2019-03-28 NOTE — PATIENT INSTRUCTIONS
Restart birth control pills as directed  Birth Control Pills   WHAT YOU NEED TO KNOW:   Birth control pills are also called oral contraceptives, or the pill  It is medicine that helps prevent pregnancy  Birth control pills work by preventing ovulation  Ovulation is when the ovaries make and release an egg cell each month  If this egg gets fertilized by sperm, pregnancy occurs  Birth control pills may also help to prevent pregnancy by keeping sperm from fertilizing an egg  DISCHARGE INSTRUCTIONS:   Follow up with your healthcare provider as directed:  Write down your questions so you remember to ask them during your visits  Advantages of birth control pills:  When birth control pills are used correctly, the chances of getting pregnant are very low  Birth control pills may help decrease bleeding and pain during your monthly period  They may also help prevent cancer of the uterus and ovaries  Disadvantages of birth control pills: You may have sudden changes in your mood or feelings while you take birth control pills  You may have nausea and decreased sex drive  You may have an increased appetite and rapid weight gain  You may also have bleeding in between periods, less frequent periods, vaginal dryness, and breast pain  Birth control pills will not protect you from sexually transmitted infections  Rarely, some birth control pills can increase your risk for a blood clot  This may become life-threatening  If you want to get pregnant: If you are planning to have a baby, ask your healthcare provider when you may stop taking your birth control pills  It may take some time for you to start ovulating again  Ask your healthcare provider for more information about pregnancy after birth control pills  When to start taking birth control pills after you have a baby: If you are not breastfeeding, you may start taking birth control pills 3 weeks after you give birth   You may be able to take certain types of birth control pills if you are breastfeeding  These pills can be started from 6 weeks to 6 months after you give birth  Ask your healthcare provider for more information about when to start taking birth control pills after you give birth  Contact your healthcare provider if:   · You have forgotten to take a birth control pill  · You have mood changes, such as depression, since starting birth control pills  · You have nausea or you are vomiting  · You have severe abdominal pain  · You missed a period and have questions or concerns about being pregnant  · You still have bleeding 4 months after taking birth control pills correctly  · You have questions or concerns about your condition or care  Return to the emergency department if:   · Your arm or leg feels warm, tender, and painful  It may look swollen and red  · You feel lightheaded, short of breath, and have chest pain  · You cough up blood  · You have any of the following signs of a stroke:      ¨ Numbness or drooping on one side of your face     ¨ Weakness in an arm or leg    ¨ Confusion or difficulty speaking    ¨ Dizziness, a severe headache, or vision loss    · You have severe pain, numbness, or swelling in your arms or legs  © 2017 [employer]  Information is for End User's use only and may not be sold, redistributed or otherwise used for commercial purposes  All illustrations and images included in CareNotes® are the copyrighted property of [employer]  or [employer]  The above information is an  only  It is not intended as medical advice for individual conditions or treatments  Talk to your doctor, nurse or pharmacist before following any medical regimen to see if it is safe and effective for you  Missed or Late Pills: For combined (Estrogen and Progestin) pills:    If one hormonal pill is LATE (<24 hours since a pill should have been taken): Take the late or missed pill as soon as possible  Continue taking the remaining pills at the usual time (even if it means taking two pills on the same day)  No additional contraceptive protection is needed  Emergency contraception is not usually needed but can be considered if hormonal pills were missed earlier in the cycle or in the last week of the previous cycle  If one hormonal pill has been MISSED (24 to <48 hours since a pill should have been taken): Take the late or missed pill as soon as possible  Continue taking the remaining pills at the usual time (even if it means taking two pills on the same day)  No additional contraceptive protection is needed  Emergency contraception is not usually needed but can be considered if hormonal pills were missed earlier in the cycle or in the last week of the previous cycle  If two or more consecutive hormonal pills have been missed: (less than or equal to 48 hours since a pill should have been taken): Take the most recent missed pill as soon as possible  (Any other missed pills should be discarded ) Continue taking the remaining pills at the usual time (even if it means taking two pills on the same day)  Use back-up contraception (e g , condoms) or avoid sexual intercourse until hormonal pills have been taken for 7 consecutive days  If pills were missed in the last week of hormonal pills (e g , days 15-21 for 28-day pill packs): Omit the hormone-free interval by finishing the horomal pills in the current pack and starting a new pack the next day  If unable to start a new pack immediately, use back-up contraception (e g , condoms) or avoid sexual intercourse until hormonal pills from a new pack have been taken for 7 consecutive days  Emergency contraception should be considered if hormonal pills were missed during the first week and unprotected sexual intercourse occurred in the previous 5 days  Emergency contraception may also be considered at other times as appropriate       Source: [place]  Selected Practice Recommendations for Contraceptive Use, 2013      Make mammogram appointment  PAP results will be avalabe in 2 weeks  Call with needs or concerns  Return in 1 year

## 2019-03-28 NOTE — PROGRESS NOTES
Annual Exam    Assessment   1  Encounter for annual routine gynecological examination  Liquid-based pap, screening   2  Encounter for screening mammogram for breast cancer  Mammo screening bilateral w cad   3  Encounter for surveillance of contraceptive pills  levonorgestrel-ethinyl estradiol (NORDETTE) 0 15-30 MG-MCG per tablet     well woman       Plan       All questions answered  Breast self exam technique reviewed and patient encouraged to perform self-exam monthly  Contraception: condoms  Discussed healthy lifestyle modifications  Follow up in 1 year  Patient Instructions   Restart birth control pills as directed  Birth Control Pills   WHAT YOU NEED TO KNOW:   Birth control pills are also called oral contraceptives, or the pill  It is medicine that helps prevent pregnancy  Birth control pills work by preventing ovulation  Ovulation is when the ovaries make and release an egg cell each month  If this egg gets fertilized by sperm, pregnancy occurs  Birth control pills may also help to prevent pregnancy by keeping sperm from fertilizing an egg  DISCHARGE INSTRUCTIONS:   Follow up with your healthcare provider as directed:  Write down your questions so you remember to ask them during your visits  Advantages of birth control pills:  When birth control pills are used correctly, the chances of getting pregnant are very low  Birth control pills may help decrease bleeding and pain during your monthly period  They may also help prevent cancer of the uterus and ovaries  Disadvantages of birth control pills: You may have sudden changes in your mood or feelings while you take birth control pills  You may have nausea and decreased sex drive  You may have an increased appetite and rapid weight gain  You may also have bleeding in between periods, less frequent periods, vaginal dryness, and breast pain  Birth control pills will not protect you from sexually transmitted infections   Rarely, some birth control pills can increase your risk for a blood clot  This may become life-threatening  If you want to get pregnant: If you are planning to have a baby, ask your healthcare provider when you may stop taking your birth control pills  It may take some time for you to start ovulating again  Ask your healthcare provider for more information about pregnancy after birth control pills  When to start taking birth control pills after you have a baby: If you are not breastfeeding, you may start taking birth control pills 3 weeks after you give birth  You may be able to take certain types of birth control pills if you are breastfeeding  These pills can be started from 6 weeks to 6 months after you give birth  Ask your healthcare provider for more information about when to start taking birth control pills after you give birth  Contact your healthcare provider if:   · You have forgotten to take a birth control pill  · You have mood changes, such as depression, since starting birth control pills  · You have nausea or you are vomiting  · You have severe abdominal pain  · You missed a period and have questions or concerns about being pregnant  · You still have bleeding 4 months after taking birth control pills correctly  · You have questions or concerns about your condition or care  Return to the emergency department if:   · Your arm or leg feels warm, tender, and painful  It may look swollen and red  · You feel lightheaded, short of breath, and have chest pain  · You cough up blood  · You have any of the following signs of a stroke:      ¨ Numbness or drooping on one side of your face     ¨ Weakness in an arm or leg    ¨ Confusion or difficulty speaking    ¨ Dizziness, a severe headache, or vision loss    · You have severe pain, numbness, or swelling in your arms or legs    © 2017 [employer] Information is for End User's use only and may not be sold, redistributed or otherwise used for commercial purposes  All illustrations and images included in CareNotes® are the copyrighted property of A [employer]  or [employer]  The above information is an  only  It is not intended as medical advice for individual conditions or treatments  Talk to your doctor, nurse or pharmacist before following any medical regimen to see if it is safe and effective for you  Missed or Late Pills: For combined (Estrogen and Progestin) pills:    If one hormonal pill is LATE (<24 hours since a pill should have been taken): Take the late or missed pill as soon as possible  Continue taking the remaining pills at the usual time (even if it means taking two pills on the same day)  No additional contraceptive protection is needed  Emergency contraception is not usually needed but can be considered if hormonal pills were missed earlier in the cycle or in the last week of the previous cycle  If one hormonal pill has been MISSED (24 to <48 hours since a pill should have been taken): Take the late or missed pill as soon as possible  Continue taking the remaining pills at the usual time (even if it means taking two pills on the same day)  No additional contraceptive protection is needed  Emergency contraception is not usually needed but can be considered if hormonal pills were missed earlier in the cycle or in the last week of the previous cycle  If two or more consecutive hormonal pills have been missed: (less than or equal to 48 hours since a pill should have been taken): Take the most recent missed pill as soon as possible  (Any other missed pills should be discarded ) Continue taking the remaining pills at the usual time (even if it means taking two pills on the same day)  Use back-up contraception (e g , condoms) or avoid sexual intercourse until hormonal pills have been taken for 7 consecutive days   If pills were missed in the last week of hormonal pills (e g , days 15-21 for 28-day pill packs): Omit the hormone-free interval by finishing the horomal pills in the current pack and starting a new pack the next day  If unable to start a new pack immediately, use back-up contraception (e g , condoms) or avoid sexual intercourse until hormonal pills from a new pack have been taken for 7 consecutive days  Emergency contraception should be considered if hormonal pills were missed during the first week and unprotected sexual intercourse occurred in the previous 5 days  Emergency contraception may also be considered at other times as appropriate  Source: [place]  Selected Practice Recommendations for Contraceptive Use, 2013  Make mammogram appointment  PAP results will be avalabe in 2 weeks  Call with needs or concerns  Return in 1 year   Pt verbalized understanding of all discussed  Subjective      Luis Mckeon is a 40 y o   female who presents for annual well woman exam  Periods are regular every 28-30 days, lasting 4 days  No intermenstrual bleeding, spotting, or discharge  Pt states she ran out of OCP's and would like to restart, currently ending her period  No unprotected intercourse since period started  1 partner x 4 years, denies domestic violence  Last WNL PAP , negative HPV, absent endocervical cells  PAP repeated today    Current contraception: OCP (estrogen/progesterone)  History of abnormal Pap smear: no  Family history of uterine or ovarian cancer: no  Regular self breast exam: yes  History of abnormal mammogram: no  Family history of breast cancer: yes - mother and grandmother, genetic information provided  History of abnormal lipids: no  Menstrual History:  OB History        3    Para   3    Term                AB        Living   3       SAB        TAB        Ectopic        Multiple        Live Births                    Menarche age: 15  Patient's last menstrual period was 2019 (exact date)    Period Pattern: (!) Irregular    The following portions of the patient's history were reviewed and updated as appropriate: allergies, current medications, past family history, past medical history, past social history, past surgical history and problem list     Review of Systems  Pertinent items are noted in HPI  Objective      /70   Pulse 96   Ht 5' 3" (1 6 m)   Wt 60 3 kg (133 lb)   LMP 03/16/2019 (Exact Date)   Breastfeeding?  No   BMI 23 56 kg/m²     General:   alert and oriented, in no acute distress, alert, appears stated age and cooperative   Heart: regular rate and rhythm, S1, S2 normal, no murmur, click, rub or gallop   Lungs: clear to auscultation bilaterally   Thyroid: Negative masses   Abdomen: soft, non-tender, without masses or organomegaly, scars noted from hernia repairs   Vulva: normal   Vagina: normal mucosa   Cervix: no cervical motion tenderness, no lesions and small amount of menstrual blood   Uterus: normal size, non-tender, normal shape and consistency   Adnexa: normal adnexa   Breasts: Bilateral breast augmentation, NT, negative masses, discharge or dimpling

## 2019-03-29 LAB
HPV HR 12 DNA CVX QL NAA+PROBE: NEGATIVE
HPV16 DNA CVX QL NAA+PROBE: NEGATIVE
HPV18 DNA CVX QL NAA+PROBE: NEGATIVE

## 2019-04-02 LAB
LAB AP GYN PRIMARY INTERPRETATION: NORMAL
Lab: NORMAL

## 2019-06-11 ENCOUNTER — TELEPHONE (OUTPATIENT)
Dept: OBGYN CLINIC | Facility: CLINIC | Age: 44
End: 2019-06-11

## 2019-07-25 ENCOUNTER — TELEPHONE (OUTPATIENT)
Dept: OBGYN CLINIC | Facility: CLINIC | Age: 44
End: 2019-07-25

## 2019-08-01 ENCOUNTER — OFFICE VISIT (OUTPATIENT)
Dept: OBGYN CLINIC | Facility: CLINIC | Age: 44
End: 2019-08-01

## 2019-08-01 VITALS
WEIGHT: 139 LBS | SYSTOLIC BLOOD PRESSURE: 110 MMHG | DIASTOLIC BLOOD PRESSURE: 60 MMHG | HEIGHT: 63 IN | BODY MASS INDEX: 24.63 KG/M2

## 2019-08-01 DIAGNOSIS — N89.8 VAGINA ITCHING: ICD-10-CM

## 2019-08-01 DIAGNOSIS — N89.8 VAGINAL DISCHARGE: Primary | ICD-10-CM

## 2019-08-01 DIAGNOSIS — B37.3 VAGINA, CANDIDIASIS: ICD-10-CM

## 2019-08-01 PROCEDURE — 99203 OFFICE O/P NEW LOW 30 MIN: CPT | Performed by: NURSE PRACTITIONER

## 2019-08-01 RX ORDER — FLUCONAZOLE 150 MG/1
150 TABLET ORAL ONCE
Qty: 1 TABLET | Refills: 0 | Status: SHIPPED | OUTPATIENT
Start: 2019-08-01 | End: 2019-08-01

## 2019-08-01 NOTE — PROGRESS NOTES
PROBLEM GYNECOLOGICAL VISIT    Larry Akbar is a 40 y o  female who presents today with complaint of vaginal discharge and itching  Her general medical history has been reviewed and she reports it as follows:    Past Medical History:   Diagnosis Date    Fibroid      Past Surgical History:   Procedure Laterality Date     SECTION  , ,     ERCP W/ SPHICTEROTOMY N/A 2016    MYOMECTOMY      [place] LAP,CHOLECYSTECTOMY N/A 2016     OB History        3    Para   3    Term   3       0    AB   0    Living   3       SAB   0    TAB   0    Ectopic   0    Multiple   0    Live Births   3               Social History     Tobacco Use    Smoking status: Never Smoker    Smokeless tobacco: Never Used   Substance Use Topics    Alcohol use: Yes     Frequency: Monthly or less     Drinks per session: 1 or 2    Drug use: Never     No current outpatient medications on file  History of Present Illness:   Reports vaginal discharge and itching for past month  She was treated for UTI with antibiotic started 19  Review of Systems:  Review of Systems   Constitutional: Negative  Gastrointestinal: Negative  Genitourinary: Positive for vaginal discharge  Negative for difficulty urinating, menstrual problem and pelvic pain  Vaginal itching   Skin: Negative  Physical Exam:  /60   Ht 5' 3" (1 6 m)   Wt 63 kg (139 lb)   LMP 2019 (Exact Date)   BMI 24 62 kg/m²   Physical Exam   Constitutional: She appears well-developed and well-nourished  Genitourinary: There is erythema in the vagina  Vaginal discharge found  Abdominal: Soft  Skin: Skin is warm and dry  Vitals reviewed  Point of Care Testing:   -Wet mount: negative   -KOH mount: + hyphae   -Whiff: negative    Assessment:   1  Vaginal candidiasis  Plan:   1   Given Rx Diflucan and Mycolog cream    2  Return to office in 2020 for annual GYN exam

## 2019-08-06 ENCOUNTER — TRANSCRIBE ORDERS (OUTPATIENT)
Dept: ADMINISTRATIVE | Facility: HOSPITAL | Age: 44
End: 2019-08-06

## 2019-08-06 DIAGNOSIS — Z12.31 VISIT FOR SCREENING MAMMOGRAM: Primary | ICD-10-CM

## 2019-08-12 ENCOUNTER — HOSPITAL ENCOUNTER (OUTPATIENT)
Dept: MAMMOGRAPHY | Facility: HOSPITAL | Age: 44
Discharge: HOME | End: 2019-08-12

## 2019-08-12 VITALS — BODY MASS INDEX: 22.49 KG/M2 | HEIGHT: 65 IN | WEIGHT: 135 LBS

## 2019-08-12 DIAGNOSIS — Z12.31 VISIT FOR SCREENING MAMMOGRAM: ICD-10-CM

## 2019-08-12 PROCEDURE — 77067 SCR MAMMO BI INCL CAD: CPT

## 2019-11-14 DIAGNOSIS — Z30.41 ENCOUNTER FOR SURVEILLANCE OF CONTRACEPTIVE PILLS: Primary | ICD-10-CM

## 2019-11-14 RX ORDER — LEVONORGESTREL AND ETHINYL ESTRADIOL 0.15-0.03
KIT ORAL
Qty: 84 TABLET | Refills: 3 | Status: SHIPPED | OUTPATIENT
Start: 2019-11-14 | End: 2020-08-06 | Stop reason: SDUPTHER

## 2020-07-08 ENCOUNTER — TELEPHONE (OUTPATIENT)
Dept: OBGYN CLINIC | Facility: CLINIC | Age: 45
End: 2020-07-08

## 2020-08-05 ENCOUNTER — TELEPHONE (OUTPATIENT)
Dept: OBGYN CLINIC | Facility: CLINIC | Age: 45
End: 2020-08-05

## 2020-08-06 ENCOUNTER — ANNUAL EXAM (OUTPATIENT)
Dept: OBGYN CLINIC | Facility: CLINIC | Age: 45
End: 2020-08-06

## 2020-08-06 VITALS
SYSTOLIC BLOOD PRESSURE: 120 MMHG | BODY MASS INDEX: 24.2 KG/M2 | HEART RATE: 79 BPM | TEMPERATURE: 98.2 F | DIASTOLIC BLOOD PRESSURE: 77 MMHG | WEIGHT: 145.4 LBS

## 2020-08-06 DIAGNOSIS — Z12.39 SCREENING FOR BREAST CANCER: ICD-10-CM

## 2020-08-06 DIAGNOSIS — Z01.419 ENCOUNTER FOR GYNECOLOGICAL EXAMINATION WITHOUT ABNORMAL FINDING: Primary | ICD-10-CM

## 2020-08-06 DIAGNOSIS — Z12.4 SCREENING FOR CERVICAL CANCER: ICD-10-CM

## 2020-08-06 DIAGNOSIS — Z30.09 UNWANTED FERTILITY: ICD-10-CM

## 2020-08-06 PROCEDURE — 87491 CHLMYD TRACH DNA AMP PROBE: CPT | Performed by: NURSE PRACTITIONER

## 2020-08-06 PROCEDURE — 87624 HPV HI-RISK TYP POOLED RSLT: CPT | Performed by: NURSE PRACTITIONER

## 2020-08-06 PROCEDURE — 99396 PREV VISIT EST AGE 40-64: CPT | Performed by: NURSE PRACTITIONER

## 2020-08-06 PROCEDURE — 87591 N.GONORRHOEAE DNA AMP PROB: CPT | Performed by: NURSE PRACTITIONER

## 2020-08-06 PROCEDURE — G0145 SCR C/V CYTO,THINLAYER,RESCR: HCPCS | Performed by: NURSE PRACTITIONER

## 2020-08-06 RX ORDER — LEVONORGESTREL AND ETHINYL ESTRADIOL 0.15-0.03
1 KIT ORAL DAILY
Qty: 84 TABLET | Refills: 3 | Status: SHIPPED | OUTPATIENT
Start: 2020-08-06 | End: 2021-08-09 | Stop reason: SDUPTHER

## 2020-08-06 NOTE — PROGRESS NOTES
Marcella Black is a 39 y o  female who presents today for annual GYN exam   Her last pap smear was performed 3/28/2019 and result was NILM  She reports no history of abnormal pap smears in her past  Her last mammogram was performed 2019 and result was WNL  She reports menses as regular  Patient's last menstrual period was 2020 (exact date)  Her contraceptive method is OCP  Her general medical history has been reviewed and she reports it as follows:    Past Medical History:   Diagnosis Date    Bell's palsy     managed with PT    Fibroid      Past Surgical History:   Procedure Laterality Date    AUGMENTATION MAMMAPLASTY Bilateral     BREAST EXCISIONAL BIOPSY Left     2013     SECTION  , ,     ERCP W/ SPHICTEROTOMY N/A 2016    Procedure: ENDOSCOPIC RETROGRADE CHOLANGIOPANCREATOGRAPHY (ERCP) W/ SPHINCTEROTOMY;  Surgeon: Arpita Tadeo MD;  Location: [facility];  Service:    Ples Boom      [place] LAP,CHOLECYSTECTOMY N/A 2016    Procedure: CHOLECYSTECTOMY LAPAROSCOPIC WITH INTRAOPERATIVE CHOLANGIOGRAM;  Surgeon: David Sutton MD;  Location: [facility];  Service: General     OB History        3    Para   3    Term   3       0    AB   0    Living   3       SAB   0    TAB   0    Ectopic   0    Multiple   0    Live Births   3               Social History     Tobacco Use    Smoking status: Never Smoker    Smokeless tobacco: Never Used   Substance Use Topics    Alcohol use: Not Currently     Frequency: Monthly or less     Drinks per session: 1 or 2     Binge frequency: Never    Drug use: Never     Cancer-related family history includes Breast cancer (age of onset: 48) in her mother and paternal grandmother; Cancer in her father; Liver cancer in her father      Current Outpatient Medications:     LILLOW 0 15-30 MG-MCG per tablet, TOME ALEJANDRA TABLETA TORICKY COWART CERVANTES, Disp: 84 tablet, Rfl: 3   nystatin-triamcinolone (MYCOLOG-II) cream, Apply topically 2 (two) times a day, Disp: 30 g, Rfl: 0    Review of Systems:  Review of Systems   Constitutional: Negative  Gastrointestinal: Negative  Genitourinary: Negative for difficulty urinating, menstrual problem, pelvic pain and vaginal discharge  Skin: Negative  Physical Exam:  /77   Pulse 79   Temp 98 2 °F (36 8 °C)   Wt 66 kg (145 lb 6 4 oz)   LMP 07/01/2020 (Exact Date)   BMI 24 20 kg/m²   Physical Exam  Constitutional:       Appearance: She is well-developed  Genitourinary:      Vagina and uterus normal       No lesions in the vagina  No vaginal discharge or rugosity  No cervical motion tenderness, lesion or pinkness  Uterus is not tender  No right or left adnexal mass present  Right adnexa not tender  Left adnexa not tender  Neck:      Musculoskeletal: Neck supple  Thyroid: No thyromegaly  Cardiovascular:      Rate and Rhythm: Normal rate and regular rhythm  Pulmonary:      Effort: Pulmonary effort is normal       Breath sounds: Normal breath sounds  Chest:      Breasts:         Right: No mass, nipple discharge, skin change or tenderness  Left: No mass, nipple discharge, skin change or tenderness  Abdominal:      General: Bowel sounds are normal       Palpations: Abdomen is soft  Neurological:      Mental Status: She is alert and oriented to person, place, and time  Skin:     General: Skin is warm and dry  Assessment/Plan:   1  Normal well-woman GYN exam   2  Cervical cancer screening:  Normal pelvic exam   Pap smear done with HPV co-testing  3  STD screening:  Orders placed for vaginal GC/CT cultures  Orders placed for serum anti-HIV, anti-HCV, HbsAg, RPR    4  Breast cancer screening:  Normal breast exam   Order placed for bilateral screening mammogram   Reviewed breast self-awareness     5  Depression Screening: Patient's depression screening was assessed with a PHQ-2 score of 1  Their PHQ-9 score was 2  Clinically patient does not have depression  No treatment is required  6  BMI Counseling: Body mass index is 24 2 kg/m²  No intervention indicated  7  Contraception:  OCP  Given Rx refills for another 12 months     8  Return to office in 1 year for annual GYN exam

## 2020-08-06 NOTE — LETTER
2020    To Dwight Suazo  : 1975      This letter is to advise you that your recent PAP SMEAR results were reviewed by me and are NORMAL    We will see you in 1 year for your annual exam     MEMO Ortiz

## 2020-08-06 NOTE — LETTER
2020    To Silverio BALBUENA: 1975      This letter is to advise you that your recent CULTURE results were reviewed by me and are NORMAL  Please contact the office for an appointment if you have any additional concerns      MEMO Watson

## 2020-08-10 LAB
C TRACH DNA SPEC QL NAA+PROBE: NEGATIVE
N GONORRHOEA DNA SPEC QL NAA+PROBE: NEGATIVE

## 2020-08-12 LAB
LAB AP GYN PRIMARY INTERPRETATION: NORMAL
LAB AP LMP: NORMAL
Lab: NORMAL

## 2021-02-04 ENCOUNTER — TELEPHONE (OUTPATIENT)
Dept: OBGYN CLINIC | Facility: CLINIC | Age: 46
End: 2021-02-04

## 2021-02-05 ENCOUNTER — OFFICE VISIT (OUTPATIENT)
Dept: OBGYN CLINIC | Facility: CLINIC | Age: 46
End: 2021-02-05

## 2021-02-05 VITALS
WEIGHT: 143 LBS | BODY MASS INDEX: 23.8 KG/M2 | HEART RATE: 91 BPM | DIASTOLIC BLOOD PRESSURE: 78 MMHG | SYSTOLIC BLOOD PRESSURE: 120 MMHG

## 2021-02-05 DIAGNOSIS — N30.00 ACUTE CYSTITIS WITHOUT HEMATURIA: ICD-10-CM

## 2021-02-05 DIAGNOSIS — B37.3 VAGINA, CANDIDIASIS: ICD-10-CM

## 2021-02-05 DIAGNOSIS — N89.8 VAGINAL DISCHARGE: Primary | ICD-10-CM

## 2021-02-05 DIAGNOSIS — R30.0 DYSURIA: ICD-10-CM

## 2021-02-05 DIAGNOSIS — N89.8 VAGINA ITCHING: ICD-10-CM

## 2021-02-05 LAB
BV WHIFF TEST VAG QL: NEGATIVE
CLUE CELLS SPEC QL WET PREP: NEGATIVE
PH SMN: 4.5 [PH]
SL AMB  POCT GLUCOSE, UA: ABNORMAL
SL AMB LEUKOCYTE ESTERASE,UA: ABNORMAL
SL AMB POCT BILIRUBIN,UA: ABNORMAL
SL AMB POCT BLOOD,UA: ABNORMAL
SL AMB POCT CLARITY,UA: CLEAR
SL AMB POCT COLOR,UA: ABNORMAL
SL AMB POCT KETONES,UA: ABNORMAL
SL AMB POCT NITRITE,UA: ABNORMAL
SL AMB POCT PH,UA: ABNORMAL
SL AMB POCT SPECIFIC GRAVITY,UA: ABNORMAL
SL AMB POCT URINE PROTEIN: ABNORMAL
SL AMB POCT UROBILINOGEN: ABNORMAL
SL AMB POCT WET MOUNT: ABNORMAL
T VAGINALIS VAG QL WET PREP: NEGATIVE
YEAST VAG QL WET PREP: POSITIVE

## 2021-02-05 PROCEDURE — 87086 URINE CULTURE/COLONY COUNT: CPT | Performed by: NURSE PRACTITIONER

## 2021-02-05 PROCEDURE — 81002 URINALYSIS NONAUTO W/O SCOPE: CPT | Performed by: NURSE PRACTITIONER

## 2021-02-05 PROCEDURE — 87210 SMEAR WET MOUNT SALINE/INK: CPT | Performed by: NURSE PRACTITIONER

## 2021-02-05 PROCEDURE — 99213 OFFICE O/P EST LOW 20 MIN: CPT | Performed by: NURSE PRACTITIONER

## 2021-02-05 RX ORDER — NITROFURANTOIN 25; 75 MG/1; MG/1
100 CAPSULE ORAL 2 TIMES DAILY
Qty: 10 CAPSULE | Refills: 0 | Status: SHIPPED | OUTPATIENT
Start: 2021-02-05 | End: 2021-02-10

## 2021-02-05 NOTE — PROGRESS NOTES
PROBLEM GYNECOLOGICAL VISIT    Tamara Cantrell is a 39 y o  female who presents today with complaint of burning with urination and vaginal itching and white discharge  Her general medical history has been reviewed and she reports it as follows:    Past Medical History:   Diagnosis Date    Bell's palsy     managed with PT    Fibroid      Past Surgical History:   Procedure Laterality Date    AUGMENTATION MAMMAPLASTY Bilateral 2018    BREAST LUMPECTOMY Left 2013    benign     SECTION  2004, ,     ERCP W/ SPHICTEROTOMY N/A 2016    MYOMECTOMY  2010    IL LAP,CHOLECYSTECTOMY N/A 2016     OB History        3    Para   3    Term   3       0    AB   0    Living   3       SAB   0    TAB   0    Ectopic   0    Multiple   0    Live Births   3               Social History     Tobacco Use    Smoking status: Never Smoker    Smokeless tobacco: Never Used   Substance Use Topics    Alcohol use: Yes     Frequency: Monthly or less     Drinks per session: 1 or 2     Binge frequency: Never    Drug use: Never       Current Outpatient Medications   Medication Instructions    Cholecalciferol 50 MCG (2000 UT) CAPS 1 capsule, Oral, Daily    levonorgestrel-ethinyl estradiol (Lillow) 0 15-30 MG-MCG per tablet 1 tablet, Oral, Daily       History of Present Illness:   Reports burning with urination, vaginal itching and white discharge for past week  Denies vaginal odor  Review of Systems:  Review of Systems   Constitutional: Negative  Gastrointestinal: Negative  Genitourinary: Positive for dysuria and vaginal discharge  Vaginal itching       Physical Exam:  /78   Pulse 91   Wt 64 9 kg (143 lb)   LMP 2021 (Exact Date)   BMI 23 80 kg/m²   Physical Exam  Constitutional:       General: She is not in acute distress  Abdominal:      Palpations: Abdomen is soft  Tenderness: There is no abdominal tenderness  Neurological:      Mental Status: She is alert  Skin:     General: Skin is warm and dry  Vitals signs reviewed  Point of Care Testing:   -Wet mount: no clue cells, no trichomonads, few WBC's + lactobacillus, pH=4 5   -KOH mount: + hyphae   -Whiff: negative   -urinalysis: + nitrites    Assessment:   1  Suspect UTI  2  Vaginal candidiasis  Plan:   1  Cultures: urine  2  Given Rx Macrobid for empiric therapy  3  Given Rx Miconazole vaginal suppositories     4  Return to office in 8/2021 for annual GYN exam

## 2021-02-06 LAB — BACTERIA UR CULT: NORMAL

## 2021-03-30 ENCOUNTER — OFFICE VISIT (OUTPATIENT)
Dept: OBGYN CLINIC | Facility: CLINIC | Age: 46
End: 2021-03-30

## 2021-03-30 VITALS
SYSTOLIC BLOOD PRESSURE: 117 MMHG | WEIGHT: 146 LBS | BODY MASS INDEX: 24.32 KG/M2 | HEIGHT: 65 IN | HEART RATE: 83 BPM | DIASTOLIC BLOOD PRESSURE: 74 MMHG

## 2021-03-30 DIAGNOSIS — N89.8 VAGINAL DISCHARGE: ICD-10-CM

## 2021-03-30 DIAGNOSIS — B37.3 VAGINA, CANDIDIASIS: ICD-10-CM

## 2021-03-30 DIAGNOSIS — N89.8 VAGINA ITCHING: Primary | ICD-10-CM

## 2021-03-30 LAB
BV WHIFF TEST VAG QL: NEGATIVE
CLUE CELLS SPEC QL WET PREP: NEGATIVE
PH SMN: 4 [PH]
SL AMB POCT WET MOUNT: ABNORMAL
T VAGINALIS VAG QL WET PREP: NEGATIVE
YEAST VAG QL WET PREP: POSITIVE

## 2021-03-30 PROCEDURE — 99213 OFFICE O/P EST LOW 20 MIN: CPT | Performed by: NURSE PRACTITIONER

## 2021-03-30 PROCEDURE — 87210 SMEAR WET MOUNT SALINE/INK: CPT | Performed by: NURSE PRACTITIONER

## 2021-03-30 RX ORDER — CEPHALEXIN 500 MG/1
500 CAPSULE ORAL 2 TIMES DAILY
COMMUNITY
Start: 2021-03-15 | End: 2021-03-30

## 2021-03-30 RX ORDER — CIPROFLOXACIN 500 MG/1
TABLET, FILM COATED ORAL
COMMUNITY
Start: 2021-03-20 | End: 2021-03-30

## 2021-03-30 RX ORDER — FLUCONAZOLE 150 MG/1
150 TABLET ORAL ONCE
Qty: 1 TABLET | Refills: 0 | Status: SHIPPED | OUTPATIENT
Start: 2021-03-30 | End: 2021-03-30

## 2021-03-30 RX ORDER — BENZONATATE 100 MG/1
100 CAPSULE ORAL 2 TIMES DAILY
COMMUNITY
Start: 2021-02-23 | End: 2021-11-30

## 2021-03-30 RX ORDER — CIPROFLOXACIN 500 MG/1
500 TABLET, FILM COATED ORAL 2 TIMES DAILY
COMMUNITY
Start: 2021-03-20 | End: 2021-03-30

## 2021-03-30 NOTE — PROGRESS NOTES
PROBLEM GYNECOLOGICAL VISIT    Gabbie Morgan is a 55 y o  female who presents today with complaint of vaginal itching and discharge  Her general medical history has been reviewed and she reports it as follows:    Past Medical History:   Diagnosis Date    Bell's palsy     managed with PT    Fibroid      Past Surgical History:   Procedure Laterality Date    AUGMENTATION MAMMAPLASTY Bilateral 2018    BREAST LUMPECTOMY Left 2013    benign     SECTION  2004, ,     ERCP W/ SPHICTEROTOMY N/A 2016    Procedure: ENDOSCOPIC RETROGRADE CHOLANGIOPANCREATOGRAPHY (ERCP) W/ SPHINCTEROTOMY;  Surgeon: Larry Alan MD;  Location: [facility];  Service:    Syed Pel    [place] LAP,CHOLECYSTECTOMY N/A 2016    Procedure: CHOLECYSTECTOMY LAPAROSCOPIC WITH INTRAOPERATIVE CHOLANGIOGRAM;  Surgeon: Fadumo Coburn MD;  Location: [facility];  Service: General     OB History        3    Para   3    Term   3       0    AB   0    Living   3       SAB   0    TAB   0    Ectopic   0    Multiple   0    Live Births   3               Social History     Tobacco Use    Smoking status: Never Smoker    Smokeless tobacco: Never Used   Substance Use Topics    Alcohol use: Yes     Frequency: Monthly or less     Drinks per session: 1 or 2     Binge frequency: Never    Drug use: Never       Current Outpatient Medications   Medication Instructions    benzonatate (TESSALON PERLES) 100 mg, Oral, 2 times daily    Cholecalciferol 50 MCG (2000 UT) CAPS 1 capsule, Oral, Daily    ciprofloxacin (CIPRO) 500 mg, Oral, 2 times daily    levonorgestrel-ethinyl estradiol (Lillow) 0 15-30 MG-MCG per tablet 1 tablet, Oral, Daily       History of Present Illness:   Reports was [facility] for abdominal pain  Was diagnosed with UTI and prescribed Cipro which she has been taking appropriately for 10 days  She has 1 day left of treatment    She reports that she has been experiencing vaginal itching and thick white discharge for past 4 days  Review of Systems:  Review of Systems   Constitutional: Negative  Gastrointestinal: Negative  Genitourinary: Positive for vaginal discharge  Negative for dysuria, menstrual problem and pelvic pain  Vaginal itching       Physical Exam:  /74   Pulse 83   Ht 5' 5" (1 651 m)   Wt 66 2 kg (146 lb)   LMP 03/04/2021 (Exact Date)   BMI 24 30 kg/m²   Physical Exam  Constitutional:       General: She is not in acute distress  Genitourinary:      Vulva normal       Vaginal discharge and erythema present  Abdominal:      Palpations: Abdomen is soft  Tenderness: There is no abdominal tenderness  Neurological:      Mental Status: She is alert  Skin:     General: Skin is warm and dry  Vitals signs reviewed  Point of Care Testing:   -Wet mount: no clue cells, no trichomonads, moderate WBC's, + lactobacillus, pH=4 0   -KOH mount: + hyphae   -Whiff: negative    Assessment:   1  Vaginal candidiasis  Plan:   1  Given Rx Diflucan and Mycolog cream    2  Return to office in 8/2021 for annual GYN exam as previously scheduled

## 2021-08-09 ENCOUNTER — ANNUAL EXAM (OUTPATIENT)
Dept: OBGYN CLINIC | Facility: CLINIC | Age: 46
End: 2021-08-09

## 2021-08-09 VITALS
DIASTOLIC BLOOD PRESSURE: 70 MMHG | SYSTOLIC BLOOD PRESSURE: 118 MMHG | BODY MASS INDEX: 25.09 KG/M2 | HEART RATE: 87 BPM | WEIGHT: 150.8 LBS

## 2021-08-09 DIAGNOSIS — Z01.419 ENCOUNTER FOR GYNECOLOGICAL EXAMINATION WITHOUT ABNORMAL FINDING: Primary | ICD-10-CM

## 2021-08-09 DIAGNOSIS — Z12.31 ENCOUNTER FOR SCREENING MAMMOGRAM FOR MALIGNANT NEOPLASM OF BREAST: ICD-10-CM

## 2021-08-09 DIAGNOSIS — Z30.09 UNWANTED FERTILITY: ICD-10-CM

## 2021-08-09 DIAGNOSIS — Z11.3 SCREEN FOR STD (SEXUALLY TRANSMITTED DISEASE): ICD-10-CM

## 2021-08-09 DIAGNOSIS — Z12.4 SCREENING FOR CERVICAL CANCER: ICD-10-CM

## 2021-08-09 PROCEDURE — 99396 PREV VISIT EST AGE 40-64: CPT | Performed by: NURSE PRACTITIONER

## 2021-08-09 PROCEDURE — 87491 CHLMYD TRACH DNA AMP PROBE: CPT | Performed by: NURSE PRACTITIONER

## 2021-08-09 PROCEDURE — 87591 N.GONORRHOEAE DNA AMP PROB: CPT | Performed by: NURSE PRACTITIONER

## 2021-08-09 RX ORDER — LEVONORGESTREL AND ETHINYL ESTRADIOL 0.15-0.03
1 KIT ORAL DAILY
Qty: 84 TABLET | Refills: 3 | Status: SHIPPED | OUTPATIENT
Start: 2021-08-09

## 2021-08-09 NOTE — PROGRESS NOTES
Ely London is a 55 y o  female who presents today for annual GYN exam   Her last pap smear was performed 3/28/2019 and result was NILM  She reports no history of abnormal pap smears in her past  Her last mammogram was performed 2021 and it noted R breast calcifications and she is currently scheduled for bilateral screening mammogram on 10/8/2021  She reports menses as regular  Patient's last menstrual period was 2021 (exact date)  Her contraceptive method is OCP's  Her general medical history has been reviewed and she reports it as follows:    Past Medical History:   Diagnosis Date    Bell's palsy     managed with PT    Fibroid      Past Surgical History:   Procedure Laterality Date    AUGMENTATION MAMMAPLASTY Bilateral 2018    BREAST LUMPECTOMY Left 2013    benign     SECTION  , ,     ERCP W/ SPHICTEROTOMY N/A 2016    Procedure: ENDOSCOPIC RETROGRADE CHOLANGIOPANCREATOGRAPHY (ERCP) W/ SPHINCTEROTOMY;  Surgeon: John Shahid MD;  [facility];  Service:     MYOMECTOMY      HI LAP,CHOLECYSTECTOMY N/A 2016    Procedure: CHOLECYSTECTOMY LAPAROSCOPIC WITH INTRAOPERATIVE CHOLANGIOGRAM;  Surgeon: Sharri Hinkle MD;  [facility];  Service: General     OB History        3    Para   3    Term   3       0    AB   0    Living   3       SAB   0    TAB   0    Ectopic   0    Multiple   0    Live Births   3               Social History     Tobacco Use    Smoking status: Never Smoker    Smokeless tobacco: Never Used   Vaping Use    Vaping Use: Never used   Substance Use Topics    Alcohol use: Yes    Drug use: Never     Cancer-related family history includes Breast cancer (age of onset: 48) in her mother and paternal grandmother; Cancer in her father  There is no history of Colon cancer or Ovarian cancer      Current Outpatient Medications   Medication Instructions    benzonatate (TESSALON PERLES) 100 mg, Oral, 2 times daily    Cholecalciferol 50 MCG (2000 UT) CAPS 1 capsule, Oral, Daily    levonorgestrel-ethinyl estradiol (Lillow) 0 15-30 MG-MCG per tablet 1 tablet, Oral, Daily    nystatin-triamcinolone (MYCOLOG-II) cream Topical, 2 times daily       Review of Systems:  Review of Systems   Constitutional: Negative  Gastrointestinal: Negative  Genitourinary: Negative for difficulty urinating, menstrual problem, pelvic pain and vaginal discharge  Skin: Negative  Physical Exam:  /70   Pulse 87   Wt 68 4 kg (150 lb 12 8 oz)   LMP 08/04/2021 (Exact Date)   BMI 25 09 kg/m²   Physical Exam  Constitutional:       General: She is not in acute distress  Appearance: She is well-developed  Genitourinary:      Vulva, vagina and uterus normal       No lesions in the vagina  No cervical motion tenderness or lesion  Uterus is not tender  No right or left adnexal mass present  Right adnexa not tender  Left adnexa not tender  Neck:      Thyroid: No thyromegaly  Cardiovascular:      Rate and Rhythm: Normal rate and regular rhythm  Pulmonary:      Effort: Pulmonary effort is normal    Chest:      Breasts:         Right: No mass, nipple discharge, skin change or tenderness  Left: No mass, nipple discharge, skin change or tenderness  Abdominal:      Palpations: Abdomen is soft  Tenderness: There is no abdominal tenderness  Musculoskeletal:      Cervical back: Neck supple  Neurological:      Mental Status: She is alert and oriented to person, place, and time  Skin:     General: Skin is warm and dry  Vitals reviewed  Assessment/Plan:   1  Normal well-woman GYN exam   2  Cervical cancer screening:  Normal cervical exam   Pap smear not indicated at this time  3  STD screening:  Orders placed for vaginal GC/CT cultures    Orders placed for serum anti-HIV, anti-HCV, HbsAg, RPR    4  Breast cancer screening:  Normal breast exam   Order placed for bilateral screening mammogram   Reviewed breast self-awareness  5  Depression Screening: Patient's depression screening was assessed with a PHQ-2 score of 2  Their PHQ-9 score was 4  Clinically patient does not have depression  No treatment is required  6  BMI Counseling: Body mass index is 25 09 kg/m²  No intervention indicated  7  Contraception:  OCP's  Given Rx refills for another year     8  Return to office in 1 year for annual GYN exam

## 2021-08-09 NOTE — LETTER
2021    To Angy Johnson  : 1975      This letter is to advise you that your recent CULTURE results were reviewed by me and are NORMAL  Please contact the office for an appointment if you have any additional concerns      MEMO Mcknight

## 2021-08-10 LAB
C TRACH DNA SPEC QL NAA+PROBE: NEGATIVE
N GONORRHOEA DNA SPEC QL NAA+PROBE: NEGATIVE

## 2021-11-30 ENCOUNTER — OFFICE VISIT (OUTPATIENT)
Dept: OBGYN CLINIC | Facility: CLINIC | Age: 46
End: 2021-11-30

## 2021-11-30 VITALS
SYSTOLIC BLOOD PRESSURE: 113 MMHG | WEIGHT: 155 LBS | HEART RATE: 98 BPM | DIASTOLIC BLOOD PRESSURE: 79 MMHG | BODY MASS INDEX: 25.79 KG/M2

## 2021-11-30 DIAGNOSIS — B37.3 VAGINAL CANDIDIASIS: ICD-10-CM

## 2021-11-30 DIAGNOSIS — N89.8 VAGINA ITCHING: Primary | ICD-10-CM

## 2021-11-30 LAB
BV WHIFF TEST VAG QL: ABNORMAL
CLUE CELLS SPEC QL WET PREP: ABNORMAL
PH SMN: 4.5 [PH]
SL AMB POCT WET MOUNT: ABNORMAL
T VAGINALIS VAG QL WET PREP: ABNORMAL
YEAST VAG QL WET PREP: ABNORMAL

## 2021-11-30 PROCEDURE — 99213 OFFICE O/P EST LOW 20 MIN: CPT | Performed by: NURSE PRACTITIONER

## 2021-11-30 PROCEDURE — 87210 SMEAR WET MOUNT SALINE/INK: CPT | Performed by: NURSE PRACTITIONER

## 2021-11-30 RX ORDER — FLUCONAZOLE 150 MG/1
150 TABLET ORAL ONCE
Qty: 1 TABLET | Refills: 0 | Status: SHIPPED | OUTPATIENT
Start: 2021-11-30 | End: 2021-11-30

## 2022-01-19 ENCOUNTER — OFFICE VISIT (OUTPATIENT)
Dept: DENTISTRY | Facility: CLINIC | Age: 47
End: 2022-01-19

## 2022-01-19 VITALS — HEART RATE: 83 BPM | SYSTOLIC BLOOD PRESSURE: 129 MMHG | DIASTOLIC BLOOD PRESSURE: 78 MMHG

## 2022-01-19 DIAGNOSIS — K02.9 DENTAL CARIES: ICD-10-CM

## 2022-01-19 DIAGNOSIS — Z01.20 ENCOUNTER FOR DENTAL EXAMINATION: Primary | ICD-10-CM

## 2022-01-19 DIAGNOSIS — K03.6 ACCRETIONS ON TEETH: ICD-10-CM

## 2022-01-19 PROCEDURE — D0274 BITEWINGS - 4 RADIOGRAPHIC IMAGES: HCPCS

## 2022-01-19 PROCEDURE — D0120 PERIODIC ORAL EVALUATION - ESTABLISHED PATIENT: HCPCS | Performed by: DENTIST

## 2022-01-19 PROCEDURE — D0230 INTRAORAL - PERIAPICAL EACH ADDITIONAL RADIOGRAPHIC IMAGE: HCPCS

## 2022-01-19 PROCEDURE — D0220 INTRAORAL - PERIAPICAL FIRST RADIOGRAPHIC IMAGE: HCPCS

## 2022-01-19 NOTE — PROGRESS NOTES
Dental procedures in this visit     - BITEWINGS - 4 RADIOGRAPHIC IMAGES (Completed)     Vertical bwx     [facility] [address] [place] [address]     Billing provider: Jean Velasco DMD     - PERIODIC ORAL EVALUATION - ESTABLISHED PATIENT (Completed)     Service provider: Junior Terrazas, [address] [facility]     Billing provider: Jean Velasco DMD     - INTRAORAL - PERIAPICAL FIRST RADIOGRAPHIC IMAGE 8 (Completed)     [facility] [address] [place] [address]     Billing provider: Jean Velasco DMD     - INTRAORAL - PERIAPICAL EACH ADDITIONAL RADIOGRAPHIC IMAGE 24 (Completed)     [facility], [address] [place] [address]     Billing provider: Jean Velasco DMD     Subjective   Patient ID: Quentin Mohan is a 55 y o  female  Chief Complaint   Patient presents with    Periodic Exam     Patient concerned about crowding lower anterior teeth  We explained she needs to have restorative taken care of and a prophy before we consult for ortho  Patient explained to [address] [place] [address] in [address] [facility] that she had Bell's Palsy a few years ago and it affected the left side of her face  condition has resolved    HPI  The following portions of the chart were reviewed this encounter and updated as appropriate:    No text in SmartText           Objective   Dental Soft Tissue Exam   Dental Exam    Radiographic Interpretation:   Associated radiographs for today's visit were reviewed and finding(s) were discussed with the patient  Findings include: 4 bitewing x-rays, PA tooth #8-9  and #23-26       BW and anterior pax    Hard Tissue Exam:    Decay noted - see charting and treatment plan  Perio:   · Perio diagnosis: Localized (<30%)    · Perio treatment recommendations: Prophy  · Frequency: Every 6 months    · Fluoride Recommendations: None   · FM probe performed  Very little change from 2020  Previous provider had suggested SRP  · Dr Rachel Logan and I agree her situation does not warrant SRP at this time    Decay noted # 28 under large amal  Evaluate # 29 possible decay under crown     Dr Sara Hannon said we will remove the old amal and decay # 29 and place GI if possible # 34      NV1  prophy  NV2  ten # 93, [id number] STEPHANIE Stewart, PHDHP discussed findings associated with the patient's periodontal and caries status  Assessment/Plan   Visit Dx (refresh)   Encounter Diagnoses   Name Primary?     Encounter for dental examination Yes    Accretions on teeth     Dental caries

## 2022-01-24 ENCOUNTER — OFFICE VISIT (OUTPATIENT)
Dept: DENTISTRY | Facility: CLINIC | Age: 47
End: 2022-01-24

## 2022-01-24 VITALS — DIASTOLIC BLOOD PRESSURE: 75 MMHG | SYSTOLIC BLOOD PRESSURE: 117 MMHG | TEMPERATURE: 97.5 F | HEART RATE: 93 BPM

## 2022-01-24 DIAGNOSIS — Z01.20 ENCOUNTER FOR DENTAL EXAMINATION: Primary | ICD-10-CM

## 2022-01-26 NOTE — PROGRESS NOTES
Pt presents for restorations #28 and 29  Medical history reviewed, no changes  Clinical exam shows recurrent decay under existing crown  Tooth was previously treated with endo, post/core  Decay on the mesial extend all the way to the post  Decay also present on the distal  Severe bone loss on the distal  The endo is compromised as decay has been present since 2015  Tooth is non restorable  Explained findings to pt  #28 shows some shadowing beneath existing large amalgam on the radiograph  Possibly decay  Recommended we check/restore #28 after ext #29 since we will have better access  Pt understands  Explained to pt she has many areas of decay and failing restorations which were not tx planned  Recommended we use today's appointment for tx planning  Pt agrees  Findings: Recurrent decay #5 requires full coverage, recurrent decay #6, decay/open margins restorations #7, 8, 9  #11 previously endo treated requires full coverage  #12 multiple large composites requires full coverage, #18 distal caries, #20 mesial caries, #21 distal caries, #29 ext  Pt interested in implants for replacing missing teeth #4, 29, and 30  Explained process of implants, CBCT, and need for implant consult with out specialist  Also explained disease control (prophy/caries control) needs to be addressed first  Pt understands  Nv: ext#29 and socket preservation     Nvv: Implant consult  Nvvv: #18, 20, 21 composites    Ww: Dr Reese Downey

## 2022-02-24 ENCOUNTER — OFFICE VISIT (OUTPATIENT)
Dept: DENTISTRY | Facility: CLINIC | Age: 47
End: 2022-02-24

## 2022-02-24 VITALS — SYSTOLIC BLOOD PRESSURE: 125 MMHG | DIASTOLIC BLOOD PRESSURE: 84 MMHG | TEMPERATURE: 97.7 F | HEART RATE: 78 BPM

## 2022-02-24 DIAGNOSIS — K03.6 ACCRETIONS ON TEETH: ICD-10-CM

## 2022-02-24 DIAGNOSIS — K03.6 DENTAL CALCULUS: ICD-10-CM

## 2022-02-24 DIAGNOSIS — Z01.20 ENCOUNTER FOR DENTAL EXAMINATION: Primary | ICD-10-CM

## 2022-02-24 PROCEDURE — D1110 PROPHYLAXIS - ADULT: HCPCS

## 2022-02-24 NOTE — PROGRESS NOTES
Prophy    Dental procedures in this visit     - PROPHYLAXIS - ADULT (Completed)     Service provider: Kasandra Velasquez [facility], [address] [facility]     Billing provider: Mark Mckenzie DMD       Method Used:  · Prophy Method Used: Hand Scaling  · Polished  · Flossed    Radiographs Taken:  · None    Intra/Extra Oral Cancer Screening:  · Within normal limits   ·   Oral Hygiene:  · Fair    Plaque:  · Generalized  · Light  · Moderate    Calculus:  · Generalized  · Light- moderate  · moderate IP calc  lower anterior teeth  difficult to scale LL molars due to position of teeth    Bleeding:  · Bleeding on probing: No periodontal exam for this visit  · Generalized  · Light    Stain:  · None     No exam needed today  Had comp exam and radiographs  Patient has multiple areas of concern and there are appointments scheduled    She is having some pain # 6 and # 28- 29 area  We have [address] planned ten and ext of # 29    NV 1  ten and extr  As charted    6 mos recall/ periodic exam    No orders of the defined types were placed in this encounter

## 2022-03-24 ENCOUNTER — OFFICE VISIT (OUTPATIENT)
Dept: DENTISTRY | Facility: CLINIC | Age: 47
End: 2022-03-24

## 2022-03-24 VITALS — TEMPERATURE: 96.9 F | DIASTOLIC BLOOD PRESSURE: 78 MMHG | HEART RATE: 91 BPM | SYSTOLIC BLOOD PRESSURE: 131 MMHG

## 2022-03-24 DIAGNOSIS — K02.9 CARIES: Primary | ICD-10-CM

## 2022-03-24 DIAGNOSIS — K08.50 DEFECTIVE DENTAL RESTORATION: ICD-10-CM

## 2022-03-24 PROCEDURE — D2332 RESIN-BASED COMPOSITE - 3 SURFACES, ANTERIOR: HCPCS | Performed by: DENTIST

## 2022-03-24 PROCEDURE — D2393 RESIN-BASED COMPOSITE - 3 SURFACES, POSTERIOR: HCPCS | Performed by: DENTIST

## 2022-03-24 NOTE — PROGRESS NOTES
Composite Filling #6 DFL and #28 DOL    Norma Garcia presents for composite filling #6 DFL and #28 DOL  PMH reviewed, no changes  Applied topical benzocaine, administered 1 carps 4% articaine 1:100k epi via infiltration  Prepped tooth #6 DFL and #28 DOL with 245 carbide on high speed  Caries removed with round carbide on slow speed  Placed CRUZ and CLEAR matrix  Isolation with cotton rolls and dri-angles    Etch with 37% H2PO4, rinse, dry  Applied Adhese with 20 second scrub once, gentle air dry and light cured for 10s  Restored with Tetric bulk crispin shade A2 and light cured  Irritation of lower inner lip on R side during polishing  Pt had limited bleeding and area was stable by end of visit  Informed patient that area may be sore due to irritation over next few days  Refined with finishing burs, polished with enhance point  Verified occlusion and contacts  Pt left satisfied      NV: Cont resins (already scheduled)    NNV: Ext #29 w/ socket preservation (already scheduled)

## 2022-03-29 ENCOUNTER — OFFICE VISIT (OUTPATIENT)
Dept: FAMILY MEDICINE CLINIC | Facility: CLINIC | Age: 47
End: 2022-03-29

## 2022-03-29 VITALS
WEIGHT: 151.6 LBS | OXYGEN SATURATION: 96 % | HEART RATE: 89 BPM | DIASTOLIC BLOOD PRESSURE: 76 MMHG | SYSTOLIC BLOOD PRESSURE: 122 MMHG | TEMPERATURE: 96.9 F | RESPIRATION RATE: 18 BRPM | BODY MASS INDEX: 25.23 KG/M2

## 2022-03-29 DIAGNOSIS — B37.3 CANDIDAL VAGINITIS: Primary | ICD-10-CM

## 2022-03-29 PROCEDURE — 99214 OFFICE O/P EST MOD 30 MIN: CPT | Performed by: FAMILY MEDICINE

## 2022-03-29 RX ORDER — FLUCONAZOLE 150 MG/1
150 TABLET ORAL ONCE
Qty: 1 TABLET | Refills: 1 | Status: SHIPPED | OUTPATIENT
Start: 2022-03-29 | End: 2022-03-29

## 2022-03-29 RX ORDER — NYSTATIN AND TRIAMCINOLONE ACETONIDE 100000; 1 [USP'U]/G; MG/G
OINTMENT TOPICAL 2 TIMES DAILY
Qty: 30 G | Refills: 0 | Status: SHIPPED | OUTPATIENT
Start: 2022-03-29 | End: 2022-07-07

## 2022-03-29 NOTE — PROGRESS NOTES
Assessment/Plan:    No problem-specific Assessment & Plan notes found for this encounter  Diagnoses and all orders for this visit:    Candidal vaginitis  -     fluconazole (DIFLUCAN) 150 mg tablet; Take 1 tablet (150 mg total) by mouth once for 1 dose  -     Comprehensive metabolic panel; Future  -     nystatin-triamcinolone (MYCOLOG-II) ointment; Apply topically 2 (two) times a day        Given repeated candidal infection, check FBG   Subjective:      Patient ID: Dominik Drummond is a 52 y o  female  53 yo with history of repetitive candidal vaginitis, here today complaining of thick white vaginal discharge with vaginal itching and irritation       The following portions of the patient's history were reviewed and updated as appropriate:   She  has a past medical history of Bell's palsy () and Fibroid  She There are no problems to display for this patient  She  has a past surgical history that includes pr lap,cholecystectomy (N/A, 2016); ERCP w/ sphicterotomy (N/A, 2016);  section (2004, , ); Myomectomy (); Augmentation mammaplasty (Bilateral, 2018); and Breast lumpectomy (Left, )  Her family history includes Breast cancer (age of onset: 48) in her mother and paternal grandmother; Cancer in her father; No Known Problems in her sister, sister, and sister  She  reports that she has never smoked  She has never used smokeless tobacco  She reports current alcohol use  She reports that she does not use drugs    Current Outpatient Medications   Medication Sig Dispense Refill    levonorgestrel-ethinyl estradiol (Lillow) 0 15-30 MG-MCG per tablet Take 1 tablet by mouth daily 84 tablet 3    Cholecalciferol 50 MCG (2000 UT) CAPS Take 1 capsule by mouth daily      fluconazole (DIFLUCAN) 150 mg tablet Take 1 tablet (150 mg total) by mouth once for 1 dose 1 tablet 1    nystatin-triamcinolone (MYCOLOG-II) ointment Apply topically 2 (two) times a day 30 g 0     No current facility-administered medications for this visit  Current Outpatient Medications on File Prior to Visit   Medication Sig    levonorgestrel-ethinyl estradiol (Lillow) 0 15-30 MG-MCG per tablet Take 1 tablet by mouth daily    Cholecalciferol 50 MCG (2000 UT) CAPS Take 1 capsule by mouth daily     No current facility-administered medications on file prior to visit       Review of Systems   Genitourinary: Positive for vaginal discharge  As per HPI   All other systems reviewed and are negative  Objective:      /76 (BP Location: Right arm, Patient Position: Sitting, Cuff Size: Standard)   Pulse 89   Temp (!) 96 9 °F (36 1 °C) (Temporal)   Resp 18   Wt 68 8 kg (151 lb 9 6 oz)   LMP 03/08/2022 (Exact Date)   SpO2 96%   BMI 25 23 kg/m²          Physical Exam  Vitals and nursing note reviewed  Constitutional:       Appearance: She is well-developed  HENT:      Head: Normocephalic  Right Ear: External ear normal       Left Ear: External ear normal       Nose: Nose normal    Eyes:      Conjunctiva/sclera: Conjunctivae normal       Pupils: Pupils are equal, round, and reactive to light  Neck:      Thyroid: No thyromegaly  Cardiovascular:      Rate and Rhythm: Normal rate and regular rhythm  Heart sounds: Normal heart sounds  Pulmonary:      Effort: Pulmonary effort is normal       Breath sounds: Normal breath sounds  Abdominal:      Palpations: Abdomen is soft  Tenderness: There is no abdominal tenderness  There is no guarding or rebound  Musculoskeletal:         General: Normal range of motion  Cervical back: Normal range of motion and neck supple  Skin:     General: Skin is dry  Neurological:      Mental Status: She is alert and oriented to person, place, and time  Deep Tendon Reflexes: Reflexes are normal and symmetric

## 2022-07-07 ENCOUNTER — OFFICE VISIT (OUTPATIENT)
Dept: OBGYN CLINIC | Facility: CLINIC | Age: 47
End: 2022-07-07

## 2022-07-07 VITALS
WEIGHT: 148.8 LBS | SYSTOLIC BLOOD PRESSURE: 115 MMHG | DIASTOLIC BLOOD PRESSURE: 74 MMHG | BODY MASS INDEX: 24.76 KG/M2 | HEART RATE: 80 BPM

## 2022-07-07 DIAGNOSIS — B37.31 VAGINAL CANDIDIASIS: ICD-10-CM

## 2022-07-07 DIAGNOSIS — N89.8 VAGINA ITCHING: Primary | ICD-10-CM

## 2022-07-07 PROCEDURE — 1036F TOBACCO NON-USER: CPT | Performed by: NURSE PRACTITIONER

## 2022-07-07 PROCEDURE — 87210 SMEAR WET MOUNT SALINE/INK: CPT | Performed by: NURSE PRACTITIONER

## 2022-07-07 PROCEDURE — 99213 OFFICE O/P EST LOW 20 MIN: CPT | Performed by: NURSE PRACTITIONER

## 2022-07-07 RX ORDER — NYSTATIN AND TRIAMCINOLONE ACETONIDE 100000; 1 [USP'U]/G; MG/G
OINTMENT TOPICAL 2 TIMES DAILY
Qty: 30 G | Refills: 0 | Status: SHIPPED | OUTPATIENT
Start: 2022-07-07 | End: 2022-08-15

## 2022-07-07 RX ORDER — FLUCONAZOLE 150 MG/1
150 TABLET ORAL ONCE
Qty: 1 TABLET | Refills: 0 | Status: SHIPPED | OUTPATIENT
Start: 2022-07-07 | End: 2022-07-07

## 2022-07-07 NOTE — PATIENT INSTRUCTIONS
Live por womack confianza en nuestro equipo  Melissa Angel y agradecemos belen comentarios  Si recibe jasiel encuesta nuestra, tómese unos momentos para informarnos cómo estamos     Sinceramente,  MEMO Wise

## 2022-07-07 NOTE — PROGRESS NOTES
PROBLEM GYNECOLOGICAL VISIT    Ceasar Manning is a 52 y o  female who presents today with complaint of vaginal itching and dryness  Her general medical history has been reviewed and she reports it as follows:    Past Medical History:   Diagnosis Date    Bell's palsy     managed with PT    Fibroid      Past Surgical History:   Procedure Laterality Date    AUGMENTATION MAMMAPLASTY Bilateral 2018    BREAST LUMPECTOMY Left 2013    benign     SECTION  2004, , 2015    ERCP W/ SPHICTEROTOMY N/A 2016    Procedure: ENDOSCOPIC RETROGRADE CHOLANGIOPANCREATOGRAPHY (ERCP) W/ SPHINCTEROTOMY;  Surgeon: Elba Buitrago MD;  [facility];  Service:     MYOMECTOMY      AR LAP,CHOLECYSTECTOMY N/A 2016    Procedure: CHOLECYSTECTOMY LAPAROSCOPIC WITH INTRAOPERATIVE CHOLANGIOGRAM;  Surgeon: Valarie John MD;  [facility];  Service: General     OB History        3    Para   3    Term   3       0    AB   0    Living   3       SAB   0    IAB   0    Ectopic   0    Multiple   0    Live Births   3               Social History     Tobacco Use    Smoking status: Never Smoker    Smokeless tobacco: Never Used   Vaping Use    Vaping Use: Never used   Substance Use Topics    Alcohol use: Yes    Drug use: Never     Social History     Substance and Sexual Activity   Sexual Activity Yes    Partners: Male    Birth control/protection: OCP, Condom Male       Current Outpatient Medications   Medication Instructions    Cholecalciferol 50 MCG (2000 UT) CAPS 1 capsule, Oral, Daily    levonorgestrel-ethinyl estradiol (Lillow) 0 15-30 MG-MCG per tablet 1 tablet, Oral, Daily       History of Present Illness:   Reports for the past 3 days she has been experiencing vaginal dryness, itching, and burning - especially with intercourse  Denies vaginal odor or discharge  Review of Systems:  Review of Systems   Constitutional: Negative  Gastrointestinal: Negative  Genitourinary: Positive for vaginal pain  Negative for dysuria, pelvic pain and vaginal discharge  Physical Exam:  /74   Pulse 80   Wt 67 5 kg (148 lb 12 8 oz)   LMP 06/24/2022 (Exact Date)   BMI 24 76 kg/m²   Physical Exam  Constitutional:       General: She is not in acute distress  Genitourinary:      Vulva exam comments: Erythema at vaginal introitus  Abdominal:      Palpations: Abdomen is soft  Tenderness: There is no abdominal tenderness  Neurological:      Mental Status: She is alert  Skin:     General: Skin is warm and dry  Vitals reviewed  Point of Care Testing:   -Wet mount: no clue cells, no trichomonads, few WBC's, pH=4 5   -KOH mount: + hyphae   -Whiff: negative    Assessment:   1  Vaginal candidiasis  Plan:   1   Given Rx Diflucan and Mycolog cream    2  Return to office as previously scheduled next month for annual GYN exam

## 2022-08-10 DIAGNOSIS — Z30.09 UNWANTED FERTILITY: ICD-10-CM

## 2022-08-11 RX ORDER — LEVONORGESTREL AND ETHINYL ESTRADIOL 0.15-0.03
KIT ORAL
Qty: 84 TABLET | Refills: 3 | Status: SHIPPED | OUTPATIENT
Start: 2022-08-11 | End: 2022-08-15 | Stop reason: SDUPTHER

## 2022-08-15 ENCOUNTER — APPOINTMENT (OUTPATIENT)
Dept: LAB | Facility: CLINIC | Age: 47
End: 2022-08-15
Payer: COMMERCIAL

## 2022-08-15 ENCOUNTER — ANNUAL EXAM (OUTPATIENT)
Dept: OBGYN CLINIC | Facility: CLINIC | Age: 47
End: 2022-08-15

## 2022-08-15 VITALS
HEIGHT: 63 IN | BODY MASS INDEX: 26.26 KG/M2 | WEIGHT: 148.2 LBS | DIASTOLIC BLOOD PRESSURE: 65 MMHG | SYSTOLIC BLOOD PRESSURE: 105 MMHG | HEART RATE: 87 BPM

## 2022-08-15 DIAGNOSIS — Z59.41 FOOD INSECURITY: Primary | ICD-10-CM

## 2022-08-15 DIAGNOSIS — Z12.4 SCREENING FOR CERVICAL CANCER: ICD-10-CM

## 2022-08-15 DIAGNOSIS — R92.2 DENSE BREASTS: ICD-10-CM

## 2022-08-15 DIAGNOSIS — Z30.09 UNWANTED FERTILITY: ICD-10-CM

## 2022-08-15 DIAGNOSIS — Z11.3 SCREEN FOR STD (SEXUALLY TRANSMITTED DISEASE): ICD-10-CM

## 2022-08-15 DIAGNOSIS — Z12.39 ENCOUNTER FOR BREAST CANCER SCREENING USING NON-MAMMOGRAM MODALITY: ICD-10-CM

## 2022-08-15 DIAGNOSIS — Z01.419 ENCOUNTER FOR GYNECOLOGICAL EXAMINATION WITHOUT ABNORMAL FINDING: ICD-10-CM

## 2022-08-15 DIAGNOSIS — Z12.11 SCREENING FOR COLON CANCER: ICD-10-CM

## 2022-08-15 DIAGNOSIS — Z12.31 ENCOUNTER FOR SCREENING MAMMOGRAM FOR MALIGNANT NEOPLASM OF BREAST: ICD-10-CM

## 2022-08-15 LAB
HBV SURFACE AG SER QL: NORMAL
HCV AB SER QL: NORMAL

## 2022-08-15 PROCEDURE — 86592 SYPHILIS TEST NON-TREP QUAL: CPT

## 2022-08-15 PROCEDURE — 87389 HIV-1 AG W/HIV-1&-2 AB AG IA: CPT

## 2022-08-15 PROCEDURE — 0503F POSTPARTUM CARE VISIT: CPT | Performed by: NURSE PRACTITIONER

## 2022-08-15 PROCEDURE — 36415 COLL VENOUS BLD VENIPUNCTURE: CPT

## 2022-08-15 PROCEDURE — 87340 HEPATITIS B SURFACE AG IA: CPT

## 2022-08-15 PROCEDURE — 87491 CHLMYD TRACH DNA AMP PROBE: CPT | Performed by: NURSE PRACTITIONER

## 2022-08-15 PROCEDURE — 86803 HEPATITIS C AB TEST: CPT

## 2022-08-15 PROCEDURE — 3725F SCREEN DEPRESSION PERFORMED: CPT | Performed by: NURSE PRACTITIONER

## 2022-08-15 PROCEDURE — 99396 PREV VISIT EST AGE 40-64: CPT | Performed by: NURSE PRACTITIONER

## 2022-08-15 PROCEDURE — 87591 N.GONORRHOEAE DNA AMP PROB: CPT | Performed by: NURSE PRACTITIONER

## 2022-08-15 RX ORDER — LEVONORGESTREL AND ETHINYL ESTRADIOL 0.15-0.03
1 KIT ORAL DAILY
Qty: 84 TABLET | Refills: 3 | Status: SHIPPED | OUTPATIENT
Start: 2022-08-15

## 2022-08-15 SDOH — ECONOMIC STABILITY - FOOD INSECURITY: FOOD INSECURITY: Z59.41

## 2022-08-15 NOTE — LETTER
2022    To Raimundo BALBUENA: 1975      This letter is to advise you that your recent CULTURES for gonorrhea and chlamydia were reviewed by me and are NORMAL  Please contact the office for an appointment if you have any additional concerns      MEMO Lorenzo

## 2022-08-15 NOTE — LETTER
2022    To Mack Becker  : 1975      This letter is to advise you that your recent BLOODWORK for Sexually-Transmitted Diseases (HIV, hepatitis B, hepatitis C, and syphilis) results were reviewed by me and are NORMAL  Please contact our office for an appointment if you have any additional concerns      MEMO Ribera

## 2022-08-15 NOTE — PATIENT INSTRUCTIONS
Live por womack confianza en nuestro equipo  Josee Innocent y agradecemos belen comentarios  Si recibe jasiel encuesta nuestra, tómese unos momentos para informarnos cómo estamos     Sinceramente,  MEMO Dove

## 2022-08-15 NOTE — PROGRESS NOTES
Main Alcazar is a 52 y o  female who presents today for annual GYN exam   Her last pap smear was performed 2020 and result was NILM with negative HPV  She reports no history of abnormal pap smears in her past  Her last mammogram was performed 10/8/2021 and result was WNL  She has never had colon cancer screening performed  She reports menses as regular  Patient's last menstrual period was 2022 (exact date)  Her general medical history has been reviewed and she reports it as follows:    Past Medical History:   Diagnosis Date    Bell's palsy     managed with PT    Fibroid      Past Surgical History:   Procedure Laterality Date    AUGMENTATION MAMMAPLASTY Bilateral 2018    BREAST LUMPECTOMY Left 2013    benign     SECTION  2004, ,     ERCP W/ SPHICTEROTOMY N/A 2016    Procedure: ENDOSCOPIC RETROGRADE CHOLANGIOPANCREATOGRAPHY (ERCP) W/ SPHINCTEROTOMY;  Surgeon: Tom Braun MD;  [facility];  Service:     MYOMECTOMY      AR LAP,CHOLECYSTECTOMY N/A 2016    Procedure: CHOLECYSTECTOMY LAPAROSCOPIC WITH INTRAOPERATIVE CHOLANGIOGRAM;  Surgeon: Ella Hennessy MD;  [facility];  Service: General     OB History        3    Para   3    Term   3       0    AB   0    Living   3       SAB   0    IAB   0    Ectopic   0    Multiple   0    Live Births   3               Social History     Tobacco Use    Smoking status: Never Smoker    Smokeless tobacco: Never Used   Vaping Use    Vaping Use: Never used   Substance Use Topics    Alcohol use: Yes    Drug use: Never     Social History     Substance and Sexual Activity   Sexual Activity Yes    Partners: Male    Birth control/protection: OCP, Condom Male     Cancer-related family history includes Breast cancer (age of onset: 48) in her mother and paternal grandmother; Cancer in her father   There is no history of Colon cancer or Ovarian cancer  Current Outpatient Medications   Medication Instructions    Altavera 0 15-30 MG-MCG per tablet TOME ALEJANDRA TABLETA TODOS LOS CERVANTES    Cholecalciferol 50 MCG (2000 UT) CAPS 1 capsule, Oral, Daily    nystatin-triamcinolone (MYCOLOG-II) ointment Topical, 2 times daily       Review of Systems:  Review of Systems   Constitutional: Negative  Gastrointestinal: Negative  Genitourinary: Negative for difficulty urinating, menstrual problem, pelvic pain and vaginal discharge  Skin: Negative  Physical Exam:  /65   Pulse 87   Ht 5' 3" (1 6 m)   Wt 67 2 kg (148 lb 3 2 oz)   LMP 07/22/2022 (Exact Date)   BMI 26 25 kg/m²   Physical Exam  Constitutional:       General: She is not in acute distress  Appearance: She is well-developed  Genitourinary:      Vulva normal       No lesions in the vagina  Right Adnexa: not tender and no mass present  Left Adnexa: not tender and no mass present  No cervical motion tenderness or lesion  Uterus is not tender  Breasts:      Right: No mass, nipple discharge, skin change or tenderness  Left: No mass, nipple discharge, skin change or tenderness  Neck:      Thyroid: No thyromegaly  Cardiovascular:      Rate and Rhythm: Normal rate and regular rhythm  Pulmonary:      Effort: Pulmonary effort is normal    Abdominal:      Palpations: Abdomen is soft  Tenderness: There is no abdominal tenderness  Musculoskeletal:      Cervical back: Neck supple  Neurological:      Mental Status: She is alert and oriented to person, place, and time  Skin:     General: Skin is warm and dry  Vitals reviewed  Assessment/Plan:   1  Normal well-woman GYN exam   2  Cervical cancer screening:  Normal cervical exam   Pap smear not indicated at this time  3  STD screening:  Orders placed for vaginal GC/CT cultures    Orders placed for serum anti-HIV, anti-HCV, HbsAg, RPR    4  Breast cancer screening:  Normal breast exam   Order placed for bilateral screening mammogram and ABUS (due to dense breasts)  Reviewed breast self-awareness  5  Colon cancer screening:  Order placed for consultation with Gastroenterology for consultation to discuss screening  6  Depression Screening: Patient's depression screening was assessed with a PHQ-2 score of 1  Their PHQ-9 score was 1  Clinically patient does not have depression  No treatment is required  7  BMI Counseling: Body mass index is 26 25 kg/m²  No intervention indicated  8  Contraception:  OCP's  Given Rx refills for another year     9  Return to office in 1 year for annual GYN exam

## 2022-08-16 LAB
C TRACH DNA SPEC QL NAA+PROBE: NEGATIVE
HIV 1+2 AB+HIV1 P24 AG SERPL QL IA: NORMAL
N GONORRHOEA DNA SPEC QL NAA+PROBE: NEGATIVE
RPR SER QL: NORMAL

## 2022-08-18 ENCOUNTER — HOSPITAL ENCOUNTER (OUTPATIENT)
Dept: ULTRASOUND IMAGING | Facility: CLINIC | Age: 47
Discharge: HOME | End: 2022-08-18
Payer: COMMERCIAL

## 2022-08-18 VITALS — HEIGHT: 63 IN | WEIGHT: 148 LBS | BODY MASS INDEX: 26.22 KG/M2

## 2022-08-18 DIAGNOSIS — R92.2 DENSE BREASTS: ICD-10-CM

## 2022-08-18 DIAGNOSIS — Z12.31 ENCOUNTER FOR SCREENING MAMMOGRAM FOR MALIGNANT NEOPLASM OF BREAST: ICD-10-CM

## 2022-08-18 PROCEDURE — 76641 ULTRASOUND BREAST COMPLETE: CPT

## 2022-08-29 ENCOUNTER — OFFICE VISIT (OUTPATIENT)
Dept: DENTISTRY | Facility: CLINIC | Age: 47
End: 2022-08-29

## 2022-08-29 VITALS — HEART RATE: 85 BPM | SYSTOLIC BLOOD PRESSURE: 123 MMHG | TEMPERATURE: 99.6 F | DIASTOLIC BLOOD PRESSURE: 69 MMHG

## 2022-08-29 DIAGNOSIS — K03.6 ACCRETIONS ON TEETH: ICD-10-CM

## 2022-08-29 DIAGNOSIS — Z01.20 ENCOUNTER FOR DENTAL EXAMINATION: Primary | ICD-10-CM

## 2022-08-29 DIAGNOSIS — K03.6 DENTAL CALCULUS: ICD-10-CM

## 2022-08-29 PROCEDURE — D1110 PROPHYLAXIS - ADULT: HCPCS

## 2022-08-29 PROCEDURE — D0120 PERIODIC ORAL EVALUATION - ESTABLISHED PATIENT: HCPCS

## 2022-08-29 NOTE — PROGRESS NOTES
Prophy    Dental procedures in this visit     - PERIODIC ORAL EVALUATION - ESTABLISHED PATIENT (Completed)     Service provider: Santos Tuttle DDS     Billing provider: Raymon Salinas DDS     - PROPHYLAXIS - ADULT (Completed)     Service provider: Pavan Carroll [facility], [address] [place] [address]     Billing provider: Raymon Salinas DDS     ASA 1  Pain 0  CC wondered when # 29 was going to be extracted/ implants placed    Method Used:  · Prophy Method Used: Hand Scaling  · Polished  · Flossed    Radiographs Taken:  · None    Intra/Extra Oral Cancer Screening:  · Within normal limits    Orthodontic Screening:  · patient interested in ortho consult  would be advisable prior to any implant discussion    Oral Hygiene:  · Good    Plaque:  · Light    Calculus:  · Light    Bleeding:  · Bleeding on probin 00%  · Light    Stain:  · None    Periodontal Charting:  · Spot probing    Periodontal Classification:  · Generalized  · Mild    Periodontitis Staging/Grading:  · Stagin  · Grading:  Ras Pratt    NV continue with ten  90 min # 7,8,9 if possible    NV 2 ortho consult  NV3 recall  NV4  ten and CRS and implant consult      No orders of the defined types were placed in this encounter

## 2022-08-30 ENCOUNTER — PATIENT OUTREACH (OUTPATIENT)
Dept: OBGYN CLINIC | Facility: CLINIC | Age: 47
End: 2022-08-30

## 2022-09-07 ENCOUNTER — PATIENT OUTREACH (OUTPATIENT)
Dept: OBGYN CLINIC | Facility: CLINIC | Age: 47
End: 2022-09-07

## 2022-09-15 ENCOUNTER — PATIENT OUTREACH (OUTPATIENT)
Dept: OBGYN CLINIC | Facility: CLINIC | Age: 47
End: 2022-09-15

## 2022-12-07 DIAGNOSIS — Z30.09 UNWANTED FERTILITY: ICD-10-CM

## 2022-12-07 RX ORDER — LEVONORGESTREL AND ETHINYL ESTRADIOL 0.15-0.03
KIT ORAL
Qty: 84 TABLET | Refills: 4 | Status: SHIPPED | OUTPATIENT
Start: 2022-12-07

## 2023-03-01 PROBLEM — Z75.8 LANGUAGE BARRIER AFFECTING HEALTH CARE: Status: ACTIVE | Noted: 2019-11-21

## 2023-03-01 PROBLEM — F43.23 SITUATIONAL MIXED ANXIETY AND DEPRESSIVE DISORDER: Status: ACTIVE | Noted: 2020-06-08

## 2023-03-01 PROBLEM — R05.8 NOCTURNAL COUGH: Status: ACTIVE | Noted: 2020-09-01

## 2023-03-01 PROBLEM — K76.0 HEPATIC STEATOSIS: Status: ACTIVE | Noted: 2022-09-07

## 2023-03-01 PROBLEM — E66.3 OVERWEIGHT (BMI 25.0-29.9): Status: ACTIVE | Noted: 2021-08-26

## 2023-03-01 PROBLEM — Z60.3 LANGUAGE BARRIER AFFECTING HEALTH CARE: Status: ACTIVE | Noted: 2019-11-21

## 2023-03-01 PROBLEM — Z78.9 LANGUAGE BARRIER AFFECTING HEALTH CARE: Status: ACTIVE | Noted: 2019-11-21

## 2023-03-01 RX ORDER — AZELASTINE HYDROCHLORIDE 137 UG/1
SPRAY, METERED NASAL
COMMUNITY
Start: 2022-12-07

## 2023-03-02 ENCOUNTER — OFFICE VISIT (OUTPATIENT)
Dept: DENTISTRY | Facility: CLINIC | Age: 48
End: 2023-03-02

## 2023-03-02 VITALS — SYSTOLIC BLOOD PRESSURE: 116 MMHG | TEMPERATURE: 98.9 F | DIASTOLIC BLOOD PRESSURE: 76 MMHG

## 2023-03-02 DIAGNOSIS — Z01.20 ENCOUNTER FOR DENTAL EXAMINATION: Primary | ICD-10-CM

## 2023-03-02 DIAGNOSIS — K03.6 ACCRETIONS ON TEETH: ICD-10-CM

## 2023-03-02 DIAGNOSIS — K03.6 DENTAL CALCULUS: ICD-10-CM

## 2023-03-02 NOTE — DENTAL PROCEDURE DETAILS
Claude Blend presents for a Periodic exam  Verbal consent for treatment given in addition to the forms  Reviewed health history - Patient is ASA I  Consents signed: Yes     Perio: Normal  Pain Scale: 0  Caries Assessment: High  Radiographs: Bitewings x4  PAX max  Anterior teeth     Oral Hygiene instruction reviewed and given  Recommended Hygiene recall visits with Shey      Referrals needed: No  Next Visit:  ten  # 7,8, 9  Dr Sunni Arenas  3/24/23    Review next ten treatment  I marked # 14 but  may change  Dr HARRELL discussed need to get ten completed prior to implants or bridges  Should also have ortho consult

## 2023-03-02 NOTE — DENTAL PROCEDURE DETAILS
Prophylaxis completed with ultrasonic  and hand instrumentation  RECALL EXAM, ADULT PROPHY AND 4BWX pax max  Anterior teeth     REVIEWED MED HX: meds, allergies, health changes reviewed in EPIC  CHIEF CONCERN:  none   PAIN SCALE:  0  ASA CLASS:  I  PLAQUE:  mild   -moderate     CALCULUS:  -    light  -  BLEEDING:   light  STAIN :   none      ORAL HYGIENE:  good - fair    PERIO:     Hand scaled, polished and flossed  Used Cavitron  Oral Hygiene Instruction:  recommended brushing 2 x daily for 2 minutes MIN, recommended flossing daily, reviewed dietary precautions  Visual and Tactile Intraoral/ Extraoral evaluation: Oral and Oropharyngeal cancer evaluation  No findings     Dr Abdiaziz Alonzo  exam=   Reviewed with patient clinical and radiographic findings and patient verbalized understanding  All questions and concerns addressed       REFERRALS: no referrals needed  CARIES FINDINGS:     As charted     TREATMENT  PLAN :   1) ten anterior teeth   Set up other ten and encourage ortho eval    Next Recall: 6 month recall   review probe    Last BWX:    3/2/23  Last Panorex/ FMX :

## 2023-03-15 ENCOUNTER — OFFICE VISIT (OUTPATIENT)
Dept: OBGYN CLINIC | Facility: CLINIC | Age: 48
End: 2023-03-15

## 2023-03-15 VITALS
HEIGHT: 63 IN | SYSTOLIC BLOOD PRESSURE: 135 MMHG | WEIGHT: 151.8 LBS | DIASTOLIC BLOOD PRESSURE: 77 MMHG | BODY MASS INDEX: 26.9 KG/M2 | HEART RATE: 106 BPM

## 2023-03-15 DIAGNOSIS — Z11.3 SCREEN FOR STD (SEXUALLY TRANSMITTED DISEASE): Primary | ICD-10-CM

## 2023-03-15 LAB
BV WHIFF TEST VAG QL: NORMAL
CLUE CELLS SPEC QL WET PREP: NORMAL
PH SMN: 4 [PH]
SL AMB POCT WET MOUNT: NORMAL
T VAGINALIS VAG QL WET PREP: NORMAL
YEAST VAG QL WET PREP: NORMAL

## 2023-03-15 NOTE — LETTER
3/17/2023    To Brennen Frandy  : 1975      Esta carta es para informarle que belen CULTURAS recientes para la gonorrea y la clamidia fueron revisadas por mí y son Breanna Allison    Comuníquese con la oficina para jasiel trish si tiene alguna inquietud [address] [place] [facility]

## 2023-03-15 NOTE — PATIENT INSTRUCTIONS
Live por womack confianza en nuestro equipo  Vanalidae Jose David y agradecemos belen comentarios  Si recibe jasiel encuesta nuestra, tómese unos momentos para informarnos cómo estamos     Sinceramente,  [address]

## 2023-03-15 NOTE — PROGRESS NOTES
PROBLEM GYNECOLOGICAL VISIT    Mony Alvarado is a 50 y o  female who presents today with complaint of possible vaginal infection  Her general medical history has been reviewed and she reports it as follows:    Past Medical History:   Diagnosis Date   • Bell's palsy     managed with PT   • Fibroid      Past Surgical History:   Procedure Laterality Date   • AUGMENTATION MAMMAPLASTY Bilateral 2018   • BREAST LUMPECTOMY Left 2013    benign   •  SECTION  2004, ,    • ERCP W/ SPHICTEROTOMY N/A 2016    Procedure: ENDOSCOPIC RETROGRADE CHOLANGIOPANCREATOGRAPHY (ERCP) W/ SPHINCTEROTOMY;  Surgeon: Young Brady MD;  [facility];  Service:    • MYOMECTOMY     • NE LAPAROSCOPY SURG CHOLECYSTECTOMY N/A 2016    Procedure: CHOLECYSTECTOMY LAPAROSCOPIC WITH INTRAOPERATIVE CHOLANGIOGRAM;  Surgeon: Lisa Broderick MD;  [facility];  Service: General     OB History        3    Para   3    Term   3       0    AB   0    Living   3       SAB   0    IAB   0    Ectopic   0    Multiple   0    Live Births   3               Social History     Tobacco Use   • Smoking status: Never   • Smokeless tobacco: Never   Vaping Use   • Vaping Use: Never used   Substance Use Topics   • Alcohol use: Not Currently   • Drug use: Never     Social History     Substance and Sexual Activity   Sexual Activity Yes   • Partners: Male   • Birth control/protection: OCP, Condom Male       Current Outpatient Medications   Medication Instructions   • Altavera 0 15-30 MG-MCG per tablet TOME ALEJANDRA TABLETA TODOS LOS CERVANTES   • Azelastine HCl 137 MCG/SPRAY SOLN No dose, route, or frequency recorded  • Cholecalciferol 50 MCG (2000 UT) CAPS 1 capsule, Oral, Daily       History of Present Illness:   Patient's sexual partner has been experiencing "sores" in his mouth and keeps telling her that it is because of having oral sex with her    He has not seen a physician or been tested, but she is concerned that he might be correct  She denies any vaginal discharge, odor, itching, irritation  She desires to be tested for vaginitis and GC/CT  Review of Systems:  Review of Systems   Constitutional: Negative  Gastrointestinal: Negative  Genitourinary: Negative  Physical Exam:  /77   Pulse (!) 106   Ht 5' 3" (1 6 m)   Wt 68 9 kg (151 lb 12 8 oz)   LMP 02/13/2023   BMI 26 89 kg/m²   Physical Exam  Constitutional:       General: She is not in acute distress  Genitourinary:      Vulva exam comments: Normal       No vaginal discharge or erythema  Neurological:      Mental Status: She is alert  Skin:     General: Skin is warm and dry  Vitals reviewed  Point of Care Testing:   -Wet mount: no clue cells, no trichomonads, moderate WBC's, many RBC's, pH=4 0   -KOH mount: no hyphae   -Whiff: negative    Assessment:   1  Normal vaginal exam     Plan:   1  Cultures ordered: GC/CT  2  Reassured patient that no evidence of vaginitis was detected  3  Return to office as previously scheduled for annual GYN exam    4  Patient's depression screening was assessed with a PHQ-2 score of 0  Their PHQ-9 score was 1  Clinically patient does not have depression  No treatment is required

## 2023-03-15 NOTE — LETTER
Alissa Cook  1975    Alissa Cook has tested negative at today's visit for vaginal yeast, vaginal trichomonas, and bacterial vaginosis      Climmie Sicard  3/15/2023

## 2023-03-16 LAB
C TRACH DNA SPEC QL NAA+PROBE: NEGATIVE
N GONORRHOEA DNA SPEC QL NAA+PROBE: NEGATIVE

## 2023-03-24 ENCOUNTER — OFFICE VISIT (OUTPATIENT)
Dept: DENTISTRY | Facility: CLINIC | Age: 48
End: 2023-03-24

## 2023-03-24 VITALS — DIASTOLIC BLOOD PRESSURE: 62 MMHG | TEMPERATURE: 98.9 F | HEART RATE: 81 BPM | SYSTOLIC BLOOD PRESSURE: 114 MMHG

## 2023-03-24 DIAGNOSIS — K02.9 RECURRENT DENTAL CARIES EXTENDING INTO DENTIN: Primary | ICD-10-CM

## 2023-03-24 NOTE — PROGRESS NOTES
Composite Filling: #7MFL, #8DFL     Mack Becker presents for composite filling  PMH reviewed, no changes  ASA Type 1  Applied topical benzocaine, administered carps 2% lido 1:100k epi via and carps 4% articaine 1:100k epi via     Prepped tooth #7MFL and #8DFL with 245 carbide on high speed  Preppared and filled each tooth separately  Caries removed with round carbide on slow speed  Placed clear matrix with wedge  Isolation with cotton rolls  Etched with 37% H2PO4, rinse, dry  Applied Adhese with 20 second scrub once, gentle air dry and light cured for 10s  Restored with Tetric bulk crispin shade A2 and light cured  Refined with finishing burs, polished with enhance point  Verified occlusion and contacts  Pt left satisfied and ambulatory  Did not have time to prep and fill Mesial of #8      Nv: #8MF and #9MF

## 2023-04-30 PROBLEM — R05.8 NOCTURNAL COUGH: Status: RESOLVED | Noted: 2020-09-01 | Resolved: 2023-04-30

## 2023-05-04 ENCOUNTER — OFFICE VISIT (OUTPATIENT)
Dept: OBGYN CLINIC | Facility: CLINIC | Age: 48
End: 2023-05-04

## 2023-05-04 VITALS
HEART RATE: 95 BPM | BODY MASS INDEX: 26.86 KG/M2 | WEIGHT: 151.6 LBS | DIASTOLIC BLOOD PRESSURE: 61 MMHG | HEIGHT: 63 IN | SYSTOLIC BLOOD PRESSURE: 124 MMHG

## 2023-05-04 DIAGNOSIS — B37.31 VAGINAL CANDIDIASIS: Primary | ICD-10-CM

## 2023-05-04 DIAGNOSIS — N89.8 VAGINA ITCHING: ICD-10-CM

## 2023-05-04 LAB
BV WHIFF TEST VAG QL: ABNORMAL
CLUE CELLS SPEC QL WET PREP: ABNORMAL
PH SMN: 4 [PH]
SL AMB POCT WET MOUNT: ABNORMAL
T VAGINALIS VAG QL WET PREP: ABNORMAL
YEAST VAG QL WET PREP: ABNORMAL

## 2023-05-04 RX ORDER — FLUCONAZOLE 150 MG/1
150 TABLET ORAL ONCE
Qty: 1 TABLET | Refills: 0 | Status: SHIPPED | OUTPATIENT
Start: 2023-05-04 | End: 2023-05-04

## 2023-05-04 NOTE — PROGRESS NOTES
PROBLEM GYNECOLOGICAL VISIT    Hector Joel is a 50 y o  female who presents today with complaint of vaginal itching/burning  Her general medical history has been reviewed and she reports it as follows:    Past Medical History:   Diagnosis Date    Bell's palsy     managed with PT    Fibroid      Past Surgical History:   Procedure Laterality Date    AUGMENTATION MAMMAPLASTY Bilateral 2018    BREAST LUMPECTOMY Left 2013    benign     SECTION  2004, ,     ERCP W/ SPHICTEROTOMY N/A 2016    Procedure: ENDOSCOPIC RETROGRADE CHOLANGIOPANCREATOGRAPHY (ERCP) W/ SPHINCTEROTOMY;  Surgeon: Haven Ziegler MD;  [facility];  Service:     MYOMECTOMY      [place] LAPAROSCOPY SURG CHOLECYSTECTOMY N/A 2016    Procedure: CHOLECYSTECTOMY LAPAROSCOPIC WITH INTRAOPERATIVE CHOLANGIOGRAM;  Surgeon: Jose Roberto Manley MD;  [facility];  Service: General     OB History        3    Para   3    Term   3       0    AB   0    Living   3       SAB   0    IAB   0    Ectopic   0    Multiple   0    Live Births   3               Social History     Tobacco Use    Smoking status: Never    Smokeless tobacco: Never   Vaping Use    Vaping Use: Never used   Substance Use Topics    Alcohol use: Not Currently    Drug use: Never     Social History     Substance and Sexual Activity   Sexual Activity Yes    Partners: Male    Birth control/protection: OCP, Condom Male       Current Outpatient Medications   Medication Instructions    Altavera 0 15-30 MG-MCG per tablet TOME ALEJANDRA TABLETA TODOS LOS CERVANTES    Azelastine HCl 137 MCG/SPRAY SOLN No dose, route, or frequency recorded   Cholecalciferol 50 MCG ( UT) CAPS 1 capsule, Oral, Daily       History of Present Illness:   Reports for the past 2 days she has been experiencing vaginal itching/pain/burning - especially when she urinates  Denies vaginal discharge or odor  Denies pelvic pain      Review of Systems:  Review of "Systems   Constitutional: Negative  Gastrointestinal: Negative  Genitourinary: Positive for vaginal pain  Negative for dysuria, pelvic pain and vaginal discharge  Physical Exam:  /61   Pulse 95   Ht 5' 3\" (1 6 m)   Wt 68 8 kg (151 lb 9 6 oz)   LMP 04/14/2023   BMI 26 85 kg/m²   Physical Exam  Constitutional:       General: She is not in acute distress  Genitourinary:      Vulva exam comments: Erythema and excoriation at vaginal introitus  Vaginal discharge and erythema present  Neurological:      Mental Status: She is alert  Skin:     General: Skin is warm and dry  Vitals reviewed  Point of Care Testing:   -Wet mount: no clue cells, no trichomonads, few WBC's, pH=4 0   -KOH mount: + hyphae   -Whiff: negative    Assessment:   1  Vaginal candidiasis  Plan:   1  Given Rx Diflucan  2  Return to office as previously scheduled for annual GYN exam    3  Patient's depression screening was assessed with a PHQ-2 score of 0  Their PHQ-9 score was 0  Clinically patient does not have depression  No treatment is required    "

## 2023-05-04 NOTE — PATIENT INSTRUCTIONS
Live por womack confianza en nuestro equipo  Alicia Pump y agradecemos belen comentarios  Si recibe jasiel encuesta nuestra, tómese unos momentos para informarnos cómo estamos     Sinceramente,  [address]

## 2023-06-06 ENCOUNTER — OFFICE VISIT (OUTPATIENT)
Dept: DENTISTRY | Facility: CLINIC | Age: 48
End: 2023-06-06

## 2023-06-06 VITALS — SYSTOLIC BLOOD PRESSURE: 132 MMHG | HEART RATE: 101 BPM | DIASTOLIC BLOOD PRESSURE: 76 MMHG

## 2023-06-06 DIAGNOSIS — K02.9 DENTAL CARIES: Primary | ICD-10-CM

## 2023-06-06 NOTE — PROGRESS NOTES
"Resin Filling #8-MF, 9-MF    Skye Garcia is a 46yo Latin female and presented to [facility] clinic for composite filling #8/9 (Per Epic 3/24/23 note: \"Did not have time to prep and fill Mesial of #8  \")    PMH reviewed, significant for:  · Hepatic steatosis  · Anxiety, depression      ASA 2  Pain = 0/10  BP = 132/76mmHg    Applied topical benzocaine, administered 0 5 carps 4% articaine 1:100k epi via ASA block    Isolation with cotton rolls    Tooth #8-MF  Removed existing resin and prepped  with tapered hellen and 330 carbide on high speed  Caries removed with 2 round carbide on slow speed  Placed mylar strip + wedge  Etched with 37% H2PO4, rinse and dry  Applied Adhese with 20 second scrub once, gentle air dry and light cured for 10s  Restored with Tetric flowable/bulk crispin shade A2 and light cured  Tooth #9-MF  Removed existing resin and prepped  with tapered hellen and 330 carbide on high speed  Caries removed with 2 round carbide on slow speed  Placed mylar strip + wedge  Etched with 37% H2PO4, rinse and dry  Applied Adhese with 20 second scrub once, gentle air dry and light cured for 10s  Restored with Tetric flowable/bulk crispin shade A2 and light cured  Refined with finishing burs, polished with enhance point  Verified occlusion and contacts  POI given  Pt left satisfied and ambulatory        NV: #18-DO() - 60min      "

## 2023-06-30 ENCOUNTER — OFFICE VISIT (OUTPATIENT)
Dept: DENTISTRY | Facility: CLINIC | Age: 48
End: 2023-06-30

## 2023-06-30 VITALS — HEART RATE: 94 BPM | DIASTOLIC BLOOD PRESSURE: 77 MMHG | SYSTOLIC BLOOD PRESSURE: 119 MMHG

## 2023-06-30 DIAGNOSIS — K02.9 DENTAL CARIES: Primary | ICD-10-CM

## 2023-06-30 NOTE — PROGRESS NOTES
Resin Filling #14-OL     Alvin Cobian is a 48yo Latin female and presented to [facility] for composite filling #14-OL     PMH reviewed, no changes       Significant findings include:  • Fatty liver  • Mood and depression disorder     ASA 2  Pain = 0/10  BP = 119/77mmHg     Applied topical benzocaine, administered 0 7 carps 4% articaine 1:100k epi via PSA and MSA blocks     Isolation with cotton rolls and dry shield        Tooth #14-OL  Removed existing amalgam  with tapered hellen and 6 round carbide on high speed  Caries removed with 4 round carbide on slow speed  Dried tooth and applied Limelight with light cure  Etched with 37% H2PO4, rinse and dry  Applied Adhese with 20 second scrub once, gentle air dry and light cured for 10s  Restored with Tetric flowable/bulk crispin shade A2 and light cured      Refined with finishing burs, polished with enhance point  Verified occlusion and contacts  POI given regarding numb lip precaution  Pt left satisfied and ambulatory      NV: cont restos

## 2023-07-07 ENCOUNTER — TELEPHONE (OUTPATIENT)
Dept: OBGYN CLINIC | Facility: CLINIC | Age: 48
End: 2023-07-07

## 2023-07-07 DIAGNOSIS — B37.31 VAGINAL CANDIDIASIS: Primary | ICD-10-CM

## 2023-07-07 RX ORDER — FLUCONAZOLE 150 MG/1
150 TABLET ORAL ONCE
Qty: 1 TABLET | Refills: 0 | Status: SHIPPED | OUTPATIENT
Start: 2023-07-07 | End: 2023-07-07

## 2023-08-02 ENCOUNTER — OFFICE VISIT (OUTPATIENT)
Dept: DENTISTRY | Facility: CLINIC | Age: 48
End: 2023-08-02

## 2023-08-02 VITALS — DIASTOLIC BLOOD PRESSURE: 84 MMHG | TEMPERATURE: 98 F | SYSTOLIC BLOOD PRESSURE: 138 MMHG | HEART RATE: 69 BPM

## 2023-08-02 DIAGNOSIS — K02.9 DENTAL CARIES: Primary | ICD-10-CM

## 2023-08-02 NOTE — PROGRESS NOTES
Composite Filling 28    Adali Klein presents for composite filling. PMH reviewed, no changes. CC- none/wants to consider implants. She was informed that consults are done in [facility] with specialist.  ASA II  Pain Scale -none at this visit. Discussed with patient need for RCT if pulp exposure occurs or in future if pulp is inflamed. Pt understands and consents. Applied topical benzocaine, administered 1x carps 2% lido 1:100k epi via buccal infiltration    Prepped tooth #28-DOL/deep lime lite base. Possible RCT; 245 carbide on high speed. Caries removed with round carbide on slow speed. Placed tofflemire/palodent matrix. Isolation with cotton rolls and dri-angles. Restored with GI ; caries deep and extends to distal; pt aware if symptomatic will need RCT. Refined with finishing burs, polished with enhance point. Verified occlusion and contacts. Pt left satisfied. 1) Pt to have implant consult for missing teeth specifically @29.   NV-#18 DO         #29 Ext with bone graft        Consult for implants or removeable

## 2023-08-16 ENCOUNTER — ANNUAL EXAM (OUTPATIENT)
Dept: OBGYN CLINIC | Facility: CLINIC | Age: 48
End: 2023-08-16

## 2023-08-16 ENCOUNTER — TELEPHONE (OUTPATIENT)
Age: 48
End: 2023-08-16

## 2023-08-16 ENCOUNTER — APPOINTMENT (OUTPATIENT)
Dept: LAB | Facility: CLINIC | Age: 48
End: 2023-08-16
Payer: COMMERCIAL

## 2023-08-16 ENCOUNTER — PREP FOR PROCEDURE (OUTPATIENT)
Age: 48
End: 2023-08-16

## 2023-08-16 VITALS
BODY MASS INDEX: 26.39 KG/M2 | WEIGHT: 149 LBS | SYSTOLIC BLOOD PRESSURE: 120 MMHG | HEART RATE: 88 BPM | DIASTOLIC BLOOD PRESSURE: 59 MMHG

## 2023-08-16 DIAGNOSIS — Z11.51 SCREENING FOR HPV (HUMAN PAPILLOMAVIRUS): ICD-10-CM

## 2023-08-16 DIAGNOSIS — Z12.11 SCREENING FOR COLON CANCER: Primary | ICD-10-CM

## 2023-08-16 DIAGNOSIS — R92.2 DENSE BREASTS: ICD-10-CM

## 2023-08-16 DIAGNOSIS — Z12.39 ENCOUNTER FOR BREAST CANCER SCREENING USING NON-MAMMOGRAM MODALITY: ICD-10-CM

## 2023-08-16 DIAGNOSIS — Z12.31 ENCOUNTER FOR SCREENING MAMMOGRAM FOR MALIGNANT NEOPLASM OF BREAST: ICD-10-CM

## 2023-08-16 DIAGNOSIS — Z11.3 SCREEN FOR STD (SEXUALLY TRANSMITTED DISEASE): ICD-10-CM

## 2023-08-16 DIAGNOSIS — Z12.11 SCREENING FOR COLON CANCER: ICD-10-CM

## 2023-08-16 DIAGNOSIS — Z01.419 ENCOUNTER FOR GYNECOLOGICAL EXAMINATION WITHOUT ABNORMAL FINDING: Primary | ICD-10-CM

## 2023-08-16 DIAGNOSIS — Z30.09 UNWANTED FERTILITY: ICD-10-CM

## 2023-08-16 DIAGNOSIS — Z12.4 SCREENING FOR CERVICAL CANCER: ICD-10-CM

## 2023-08-16 DIAGNOSIS — Z91.89 INCREASED RISK OF BREAST CANCER: ICD-10-CM

## 2023-08-16 PROBLEM — K76.0 HEPATIC STEATOSIS: Status: RESOLVED | Noted: 2022-09-07 | Resolved: 2023-08-16

## 2023-08-16 PROBLEM — E66.3 OVERWEIGHT (BMI 25.0-29.9): Status: RESOLVED | Noted: 2021-08-26 | Resolved: 2023-08-16

## 2023-08-16 PROBLEM — Z75.8 LANGUAGE BARRIER AFFECTING HEALTH CARE: Status: RESOLVED | Noted: 2019-11-21 | Resolved: 2023-08-16

## 2023-08-16 PROBLEM — Z78.9 LANGUAGE BARRIER AFFECTING HEALTH CARE: Status: RESOLVED | Noted: 2019-11-21 | Resolved: 2023-08-16

## 2023-08-16 PROBLEM — Z60.3 LANGUAGE BARRIER AFFECTING HEALTH CARE: Status: RESOLVED | Noted: 2019-11-21 | Resolved: 2023-08-16

## 2023-08-16 PROBLEM — F43.23 SITUATIONAL MIXED ANXIETY AND DEPRESSIVE DISORDER: Status: RESOLVED | Noted: 2020-06-08 | Resolved: 2023-08-16

## 2023-08-16 LAB
HBV SURFACE AG SER QL: NORMAL
HCV AB SER QL: NORMAL
HIV 1+2 AB+HIV1 P24 AG SERPL QL IA: NORMAL
HIV 2 AB SERPL QL IA: NORMAL
HIV1 AB SERPL QL IA: NORMAL
HIV1 P24 AG SERPL QL IA: NORMAL
TREPONEMA PALLIDUM IGG+IGM AB [PRESENCE] IN SERUM OR PLASMA BY IMMUNOASSAY: NORMAL

## 2023-08-16 PROCEDURE — G0476 HPV COMBO ASSAY CA SCREEN: HCPCS | Performed by: NURSE PRACTITIONER

## 2023-08-16 PROCEDURE — 99396 PREV VISIT EST AGE 40-64: CPT | Performed by: NURSE PRACTITIONER

## 2023-08-16 PROCEDURE — 87491 CHLMYD TRACH DNA AMP PROBE: CPT | Performed by: NURSE PRACTITIONER

## 2023-08-16 PROCEDURE — 87591 N.GONORRHOEAE DNA AMP PROB: CPT | Performed by: NURSE PRACTITIONER

## 2023-08-16 PROCEDURE — 87340 HEPATITIS B SURFACE AG IA: CPT

## 2023-08-16 PROCEDURE — G0145 SCR C/V CYTO,THINLAYER,RESCR: HCPCS | Performed by: NURSE PRACTITIONER

## 2023-08-16 PROCEDURE — 86780 TREPONEMA PALLIDUM: CPT

## 2023-08-16 PROCEDURE — 36415 COLL VENOUS BLD VENIPUNCTURE: CPT

## 2023-08-16 PROCEDURE — 87389 HIV-1 AG W/HIV-1&-2 AB AG IA: CPT

## 2023-08-16 PROCEDURE — 86803 HEPATITIS C AB TEST: CPT

## 2023-08-16 RX ORDER — ESTRADIOL 0.1 MG/G
CREAM VAGINAL
COMMUNITY
Start: 2023-04-26 | End: 2023-08-16

## 2023-08-16 RX ORDER — LEVONORGESTREL AND ETHINYL ESTRADIOL 0.15-0.03
1 KIT ORAL DAILY
Qty: 84 TABLET | Refills: 4 | Status: SHIPPED | OUTPATIENT
Start: 2023-08-16

## 2023-08-16 NOTE — LETTER
2023    To Tashia Ellsworth  : 1975      Esta carta es para informarle que belen resultados recientes de ANÁLISIS DE [place] para Enfermedades de Transmisión Sexual (Reymundo Slider, hepatitis B, hepatitis C, y sífilis) fueron revisados por mamie y son Grace Currie.   Póngase en contacto con nuestra oficina para jasiel trish si tiene alguna inquietud [place]

## 2023-08-16 NOTE — TELEPHONE ENCOUNTER
Scheduled date of colonoscopy (as of today): 09/15/2023    Physician performing colonoscopy: Dr. Bruna Heard  Location of colonoscopy: [facility]  Bowel prep reviewed with patient: Miralax/Dulcolax    Clearances: N/A

## 2023-08-16 NOTE — PROGRESS NOTES
Shira Bragg is a 50 y.o. female who presents today for annual GYN exam.  Her last pap smear was performed 2020 and result was NILM with negative HPV. She reports no history of abnormal pap smears in her past.  Her last mammogram was performed 10/14/2022 and result was WNL. She has never had colon cancer screening performed. She reports menses as regular. Patient's last menstrual period was 2023 (exact date). Her general medical history has been reviewed and she reports it as follows:    Past Medical History:   Diagnosis Date   • Bell's palsy     managed with PT   • Fibroid      Past Surgical History:   Procedure Laterality Date   • AUGMENTATION MAMMAPLASTY Bilateral    • BREAST LUMPECTOMY Left 2013    benign   •  SECTION  , ,    • ERCP W/ SPHICTEROTOMY N/A 2016    Procedure: ENDOSCOPIC RETROGRADE CHOLANGIOPANCREATOGRAPHY (ERCP) W/ SPHINCTEROTOMY;  Surgeon: Yrn Chavarria MD;  [facility];  Service:    • MYOMECTOMY     • [place] LAPAROSCOPY SURG CHOLECYSTECTOMY N/A 2016    Procedure: CHOLECYSTECTOMY LAPAROSCOPIC WITH INTRAOPERATIVE CHOLANGIOGRAM;  Surgeon: Marlen Kebede MD;  [facility];  Service: General     OB History        3    Para   3    Term   3       0    AB   0    Living   3       SAB   0    IAB   0    Ectopic   0    Multiple   0    Live Births   3               Social History     Tobacco Use   • Smoking status: Never   • Smokeless tobacco: Never   Vaping Use   • Vaping Use: Never used   Substance Use Topics   • Alcohol use: Not Currently   • Drug use: Never     Social History     Substance and Sexual Activity   Sexual Activity Yes   • Partners: Male   • Birth control/protection: OCP, Condom Male     Cancer-related family history includes Breast cancer (age of onset: 48) in her mother and paternal grandmother; Cancer in her father.  There is no history of Colon cancer or Ovarian cancer. Current Outpatient Medications   Medication Instructions   • Azelastine HCl 137 MCG/SPRAY SOLN No dose, route, or frequency recorded. • Cholecalciferol 50 MCG (2000 UT) CAPS 1 capsule, Oral, Daily   • levonorgestrel-ethinyl estradiol (Altavera) 0.15-30 MG-MCG per tablet 1 tablet, Oral, Daily       Review of Systems:  Review of Systems   Constitutional: Negative. Gastrointestinal: Negative. Genitourinary: Negative for difficulty urinating, menstrual problem, pelvic pain and vaginal discharge. Vaginal dryness and itching   Skin: Negative. Physical Exam:  /59   Pulse 88   Wt 67.6 kg (149 lb)   LMP 07/31/2023 (Exact Date)   BMI 26.39 kg/m²   Physical Exam  Constitutional:       General: She is not in acute distress. Appearance: She is well-developed. Genitourinary:      Vulva normal.      No lesions in the vagina. Vaginal discharge present. Right Adnexa: not tender and no mass present. Left Adnexa: not tender and no mass present. No cervical motion tenderness or lesion. Uterus is not tender. Breasts:     Right: No mass, nipple discharge, skin change or tenderness. Left: No mass, nipple discharge, skin change or tenderness. Neck:      Thyroid: No thyromegaly. Cardiovascular:      Rate and Rhythm: Normal rate and regular rhythm. Pulmonary:      Effort: Pulmonary effort is normal.   Abdominal:      Palpations: Abdomen is soft. Tenderness: There is no abdominal tenderness. Musculoskeletal:      Cervical back: Neck supple. Neurological:      Mental Status: She is alert and oriented to person, place, and time. Skin:     General: Skin is warm and dry. Vitals reviewed. Assessment/Plan:   1. Normal well-woman GYN exam.  2. Cervical cancer screening:  Normal cervical exam.  Pap smear done with HPV co-testing. Has not received HPV vaccine in the past.   3. STD screening:  Orders placed for vaginal GC/CT cultures. Orders placed for serum anti-HIV, anti-HCV, HbsAg, syphilis panel. 4. Breast cancer screening:  Normal breast exam.  High risk for breast CA with Tyrer-Cuzick score of 34.06%. Order placed for bilateral screening mammogram and ABUS. Reviewed breast self-awareness. 5. Colon cancer screening:  Order placed for consultation with Gastroenterology for consultation to discuss screening. 6. Depression Screening: Patient's depression screening was assessed with a PHQ-2 score of 0. Their PHQ-9 score was 0. Clinically patient does not have depression. No treatment is required. 7. BMI Counseling: Body mass index is 26.39 kg/m². No intervention indicated. 8. Contraception:  OCP's. Given Rx refills for another year.    9. Return to office in 1 year for annual GYN exam.

## 2023-08-16 NOTE — LETTER
2023    To Griselda Renee YOB: 1975      Esta carta es para informarle que belen CULTURAS recientes para la gonorrea y la clamidia fueron revisadas por mí y son Ladean Philip.   Comuníquese con la oficina para jasiel trish si tiene alguna inquietud Oskar Medeiros

## 2023-08-16 NOTE — TELEPHONE ENCOUNTER
[facility] Assessment    Name: Denise Aguayo  YOB: 1975  Last Height: 5' 3" (1.6 m)  Last weight: 67.6 kg (149 lb)  BMI: 26.39 kg/m²  Procedure: Colon  Diagnosis: Screening  Date of procedure: 9/15/2023  Prep: Brian/Alma  Responsible : Son Bruna Mccray  Phone#: [phone]  Name completing form: Angelica Dinh  Date form completed: 08/16/23      If the patient answers yes to any of these questions, schedule in a hospital  Are you pregnant: No  Do you rely on a wheelchair for mobility: No  Have you been diagnosed with End Stage Renal Disease (ESRD): No  Do you need oxygen during the day: No  Have you had a heart attack or stroke within the past three months: No  Have you had a seizure within the past three months: No  Have you ever been informed by anesthesia that you have a difficult airway: No  Additional Questions  Have you had any cardiac testing or are under the care of a Cardiologist (see cardiac list): No  Cardiac list:   Do you have an implanted cardiac defibrillator: No (Comment:  This patient should be scheduled in the hospital)    Have any bleeding problems, such as anemia or hemophilia (If patient has H&H result below 8, schedule in hospital.  H&H must be within 30 days of procedure): No    Had an organ transplant within the past 3 months: No    Do you have any present infections: No  Do you get short of breath when walking a few blocks: No  Have you been diagnosed with diabetes: No  Comments (provide cardiac provider information if applicable):

## 2023-08-16 NOTE — PATIENT INSTRUCTIONS
Live por womack confianza en nuestro equipo. Nancy Games y agradecemos belen comentarios. Si recibe jasiel encuesta nuestra, tómese unos momentos para informarnos cómo estamos.    Sinceramente,  [address] [place]

## 2023-08-17 ENCOUNTER — OFFICE VISIT (OUTPATIENT)
Dept: DENTISTRY | Facility: CLINIC | Age: 48
End: 2023-08-17

## 2023-08-17 VITALS — SYSTOLIC BLOOD PRESSURE: 132 MMHG | HEART RATE: 99 BPM | TEMPERATURE: 97.5 F | DIASTOLIC BLOOD PRESSURE: 66 MMHG

## 2023-08-17 DIAGNOSIS — K02.9 CARIES LESION, APPROXIMAL SURFACE, D3, ACTIVE: Primary | ICD-10-CM

## 2023-08-17 PROCEDURE — D2392 RESIN-BASED COMPOSITE - 2 SURFACES, POSTERIOR: HCPCS | Performed by: DENTIST

## 2023-08-17 NOTE — DENTAL PROCEDURE DETAILS
Composite Filling #18 DO    Qiana Valentin presents for composite filling. PMH reviewed, no changes. ASA: II  Pain score: 0  Chief complain: "I need filling lower left tooth"  EOE: WNL. IOE: Tooth #18 is shifted and inclined mesially with lingo-version occlusion. Existing deep distal caries under CEJ and subgingival. Buccal gum recession 3-4 mm. Prognosis is guarded. Informed and demonstrated to patient on radiograph. If tooth develops symptoms, then extraction will be needed. Patient aware and understood. Patient consents treatment. Applied topical benzocaine, administered one carps 1.7 ML 2% lido 1:100k epi via mandibular block and infiltration injections. Prepped tooth #18 DO with 245 carbide on high speed. Caries removed with round carbide on slow speed. Placed tofflemire matrix with extension and wedge. Isolation with cotton rolls and dri-angles    Etch with 37% H2PO4, rinse, dry. Applied Adhese with 20 second scrub once, gentle air dry and light cured for 10s. Restored with Tetric bulk crispin shade A2 and light cured. Refined with finishing burs, polished with enhance point. Verified occlusion and contacts. POI is given. Pt left satisfied and ambulatory. NV: patient reported she is scheduled for implant at [facility].

## 2023-08-18 LAB
HPV HR 12 DNA CVX QL NAA+PROBE: POSITIVE
HPV16 DNA CVX QL NAA+PROBE: NEGATIVE
HPV18 DNA CVX QL NAA+PROBE: NEGATIVE

## 2023-08-19 LAB
C TRACH DNA SPEC QL NAA+PROBE: NEGATIVE
N GONORRHOEA DNA SPEC QL NAA+PROBE: NEGATIVE

## 2023-08-21 LAB
LAB AP GYN PRIMARY INTERPRETATION: NORMAL
Lab: NORMAL

## 2023-08-22 ENCOUNTER — TELEPHONE (OUTPATIENT)
Dept: OBGYN CLINIC | Facility: CLINIC | Age: 48
End: 2023-08-22

## 2023-08-22 NOTE — TELEPHONE ENCOUNTER
Please advise patient in Mongolian of normal pap smear but + HPV. No intervention indicated but needs pap smear repeated next year.

## 2023-09-06 ENCOUNTER — OFFICE VISIT (OUTPATIENT)
Dept: DENTISTRY | Facility: CLINIC | Age: 48
End: 2023-09-06

## 2023-09-06 VITALS — TEMPERATURE: 99.1 F | SYSTOLIC BLOOD PRESSURE: 128 MMHG | HEART RATE: 65 BPM | DIASTOLIC BLOOD PRESSURE: 69 MMHG

## 2023-09-06 DIAGNOSIS — K03.6 ACCRETIONS ON TEETH: ICD-10-CM

## 2023-09-06 DIAGNOSIS — K03.6 DENTAL CALCULUS: ICD-10-CM

## 2023-09-06 DIAGNOSIS — Z01.20 ENCOUNTER FOR DENTAL EXAMINATION: Primary | ICD-10-CM

## 2023-09-06 PROCEDURE — D0120 PERIODIC ORAL EVALUATION - ESTABLISHED PATIENT: HCPCS

## 2023-09-06 PROCEDURE — D1110 PROPHYLAXIS - ADULT: HCPCS

## 2023-09-06 NOTE — DENTAL PROCEDURE DETAILS
See exam note    Prophylaxis completed with ultrasonic  and hand instrumentation. Soft plaque removed and supragingival calculus removed    Polished with prophy cup and paste. Flossed and provided Oral Health Instructions. Demonstrated proper brushing and flossing technique. Patient left satisfied and ambulatory.   Very nice lady   stressed flossing  rough margins attract plaque to teeth  bleeding with scaling noted lower anterior teeth  6 mos recall   BWX and PAX as needed # 11, 5

## 2023-09-06 NOTE — DENTAL PROCEDURE DETAILS
Allen Finney presents for a Periodic exam. Verbal consent for treatment given in addition to the forms. Reviewed health history - Patient is ASA I  Consents signed: Yes    TRANSLATION USED ( [id number])    SHE UNDERSTANDS eNGLISH PRETTY WELL BUT I WANTED TO MAKE SURE SHE UNDERSTOOD TREATMENT      Perio: Recession  Pain Scale: 0  Caries Assessment: High  Radiographs: None     Oral Hygiene instruction reviewed and given. Recommended Hygiene recall visits with  [place]. Treatment Plan:  1. Infection control:    2. Periodontal therapy: adult prophy   3. Caries control: as charted  NEEDS CROWNS # 5, 11 AND EXTR # 29/ BONE GRAFT  4. Occlusal evaluation:   SUGGESTED ORTHO AGAIN  anterior crowding and tipped molars LL    EXAM  DR JAQUEZ     Prognosis is Good.   Referrals needed: No  Next Visit:  extr # 29 ( after pre auth for bone graft)  Pre auth CRS # 11 endo treated tooth and # 5 large composite    Discuss options to replace missing teeth   implants or RPDs

## 2023-09-08 ENCOUNTER — TELEPHONE (OUTPATIENT)
Dept: GASTROENTEROLOGY | Facility: CLINIC | Age: 48
End: 2023-09-08

## 2023-09-08 NOTE — TELEPHONE ENCOUNTER
Patient confirm colonoscopy, I also when over her instructions, everything was explain in  English to the patient.

## 2023-09-13 RX ORDER — SODIUM CHLORIDE 9 MG/ML
125 INJECTION, SOLUTION INTRAVENOUS CONTINUOUS
Status: CANCELLED | OUTPATIENT
Start: 2023-09-13

## 2023-09-14 ENCOUNTER — NURSE TRIAGE (OUTPATIENT)
Age: 48
End: 2023-09-14

## 2023-09-14 NOTE — TELEPHONE ENCOUNTER
Returned call to patient, she required  services. K#[id number]. Patient called in asking for time of her procedure scheduled 9/15/23. I explained that she would receive a call after 3:30 pm this afternoon to inform her of appointment time tomorrow.

## 2023-09-15 ENCOUNTER — ANESTHESIA EVENT (OUTPATIENT)
Dept: GASTROENTEROLOGY | Facility: MEDICAL CENTER | Age: 48
End: 2023-09-15

## 2023-09-15 ENCOUNTER — HOSPITAL ENCOUNTER (OUTPATIENT)
Dept: GASTROENTEROLOGY | Facility: MEDICAL CENTER | Age: 48
Setting detail: OUTPATIENT SURGERY
End: 2023-09-15
Payer: COMMERCIAL

## 2023-09-15 ENCOUNTER — ANESTHESIA (OUTPATIENT)
Dept: GASTROENTEROLOGY | Facility: MEDICAL CENTER | Age: 48
End: 2023-09-15

## 2023-09-15 VITALS
WEIGHT: 150 LBS | RESPIRATION RATE: 15 BRPM | OXYGEN SATURATION: 99 % | BODY MASS INDEX: 26.58 KG/M2 | DIASTOLIC BLOOD PRESSURE: 85 MMHG | TEMPERATURE: 98.5 F | SYSTOLIC BLOOD PRESSURE: 153 MMHG | HEIGHT: 63 IN | HEART RATE: 68 BPM

## 2023-09-15 DIAGNOSIS — Z12.11 SCREENING FOR COLON CANCER: ICD-10-CM

## 2023-09-15 LAB
EXT PREGNANCY TEST URINE: NEGATIVE
EXT. CONTROL: NORMAL

## 2023-09-15 PROCEDURE — 81025 URINE PREGNANCY TEST: CPT | Performed by: ANESTHESIOLOGY

## 2023-09-15 RX ORDER — PROPOFOL 10 MG/ML
INJECTION, EMULSION INTRAVENOUS CONTINUOUS PRN
Status: DISCONTINUED | OUTPATIENT
Start: 2023-09-15 | End: 2023-09-15

## 2023-09-15 RX ORDER — SODIUM CHLORIDE 9 MG/ML
125 INJECTION, SOLUTION INTRAVENOUS CONTINUOUS
Status: DISCONTINUED | OUTPATIENT
Start: 2023-09-15 | End: 2023-09-19 | Stop reason: HOSPADM

## 2023-09-15 RX ORDER — PROPOFOL 10 MG/ML
INJECTION, EMULSION INTRAVENOUS AS NEEDED
Status: DISCONTINUED | OUTPATIENT
Start: 2023-09-15 | End: 2023-09-15

## 2023-09-15 RX ADMIN — Medication 40 MG: at 09:58

## 2023-09-15 RX ADMIN — PROPOFOL 120 MG: 10 INJECTION, EMULSION INTRAVENOUS at 09:39

## 2023-09-15 RX ADMIN — SODIUM CHLORIDE: 0.9 INJECTION, SOLUTION INTRAVENOUS at 09:33

## 2023-09-15 RX ADMIN — PROPOFOL 40 MG: 10 INJECTION, EMULSION INTRAVENOUS at 09:42

## 2023-09-15 RX ADMIN — PROPOFOL 20 MG: 10 INJECTION, EMULSION INTRAVENOUS at 09:43

## 2023-09-15 RX ADMIN — PROPOFOL 120 MCG/KG/MIN: 10 INJECTION, EMULSION INTRAVENOUS at 09:39

## 2023-09-15 NOTE — H&P
History and Physical -  [facility]  Lucía Martell 50 y.o. female MRN: [id number]                  HPI: Lucía Martell is a 50y.o. year old female who presents for colon cancer screening. REVIEW OF SYSTEMS: Per the HPI, and otherwise unremarkable.     Historical Information   Past Medical History:   Diagnosis Date   • Abnormal Pap smear of cervix     2023 NILM pap/+ other HRHPV   • Bell's palsy     managed with PT   • Fibroid      Past Surgical History:   Procedure Laterality Date   • AUGMENTATION MAMMAPLASTY Bilateral    • BREAST LUMPECTOMY Left 2013    benign   •  SECTION  , ,    • ERCP W/ SPHICTEROTOMY N/A 2016    Procedure: ENDOSCOPIC RETROGRADE CHOLANGIOPANCREATOGRAPHY (ERCP) W/ SPHINCTEROTOMY;  Surgeon: Melody Jorge MD;  [facility];  Service:    • MYOMECTOMY     • [place] LAPAROSCOPY SURG CHOLECYSTECTOMY N/A 2016    Procedure: CHOLECYSTECTOMY LAPAROSCOPIC WITH INTRAOPERATIVE CHOLANGIOGRAM;  Surgeon: Norval Hodgkin, MD;  [facility];  Service: General     Social History   Social History     Substance and Sexual Activity   Alcohol Use Not Currently     Social History     Substance and Sexual Activity   Drug Use Never     Social History     Tobacco Use   Smoking Status Never   Smokeless Tobacco Never     Family History   Problem Relation Age of Onset   • Breast cancer Mother 48   • Cancer Father         liver   • Breast cancer Paternal Grandmother 48   • No Known Problems Sister    • No Known Problems Sister    • No Known Problems Sister    • Colon cancer Neg Hx    • Ovarian cancer Neg Hx        Meds/Allergies       Current Outpatient Medications:   •  Azelastine HCl 137 MCG/SPRAY SOLN  •  Cholecalciferol 50 MCG (2000) CAPS  •  levonorgestrel-ethinyl estradiol (Jose Guadalupe Chambers) 0.15-30 MG-MCG per tablet    Allergies   Allergen Reactions   • Iodine - Food Allergy Dizziness, Facial Swelling, Anxiety, Itching and Shortness Of Breath       Objective     LMP 07/31/2023 (Exact Date)       PHYSICAL EXAM    Gen: NAD  Head: NCAT  CV: RRR  CHEST: Clear  ABD: soft, NT/ND  EXT: no edema      ASSESSMENT/PLAN:  This is a 50y.o. year old female here for colonoscopy, and she is stable and optimized for her procedure.

## 2023-09-15 NOTE — ANESTHESIA PREPROCEDURE EVALUATION
Review of Systems/Medical History  Patient summary reviewed. Chart reviewed. No history of anesthetic complications     Cardiovascular  Negative cardio ROS    Pulmonary  Negative pulmonary ROS        GI/Hepatic            Endo/Other  Negative endo/other ROS      GYN       Hematology   Musculoskeletal       Neurology   Psychology           Physical Exam    Airway    Mallampati score: II  TM Distance: >3 FB  Neck ROM: full     Dental   No notable dental hx     Cardiovascular  Comment: Negative ROS, Rhythm: regular, Rate: normal, Cardiovascular exam normal    Pulmonary  Pulmonary exam normal Breath sounds clear to auscultation,     Other Findings        Anesthesia Plan  ASA Score- 2     Anesthesia Type- IV sedation with anesthesia with ASA Monitors. Additional Monitors:   Airway Plan:           Plan Factors-    Chart reviewed. Existing labs reviewed. Patient summary reviewed. Patient is not a current smoker. Induction- intravenous. Postoperative Plan-     Informed Consent- Anesthetic plan and risks discussed with patient.

## 2023-09-15 NOTE — ANESTHESIA POSTPROCEDURE EVALUATION
Post-Op Assessment Note    CV Status:  Stable    Pain management: adequate     Mental Status:  Alert and awake   Hydration Status:  Euvolemic   PONV Controlled:  Controlled   Airway Patency:  Patent      Post Op Vitals Reviewed: Yes      Staff: Anesthesiologist         No notable events documented.     BP      Temp      Pulse     Resp      SpO2      /85   Pulse 68   Temp 98.5 °F (36.9 °C) (Temporal)   Resp 15   Ht 5' 3" (1.6 m)   Wt 68 kg (150 lb)   LMP 09/06/2023 (Exact Date) Comment: pt reports taking birth control pills, LMP 09/06/2023, UHCG negative on 09/15/2023  SpO2 99%   BMI 26.57 kg/m²

## 2023-10-19 ENCOUNTER — TELEPHONE (OUTPATIENT)
Dept: OBGYN CLINIC | Facility: CLINIC | Age: 48
End: 2023-10-19

## 2023-10-19 DIAGNOSIS — B37.31 VAGINAL CANDIDIASIS: Primary | ICD-10-CM

## 2023-10-19 RX ORDER — FLUCONAZOLE 150 MG/1
150 TABLET ORAL ONCE
Qty: 1 TABLET | Refills: 0 | Status: SHIPPED | OUTPATIENT
Start: 2023-10-19 | End: 2023-10-19

## 2024-03-05 ENCOUNTER — TELEPHONE (OUTPATIENT)
Dept: OBGYN CLINIC | Facility: CLINIC | Age: 49
End: 2024-03-05

## 2024-03-07 ENCOUNTER — OFFICE VISIT (OUTPATIENT)
Dept: OBGYN CLINIC | Facility: CLINIC | Age: 49
End: 2024-03-07

## 2024-03-07 VITALS
BODY MASS INDEX: 25.86 KG/M2 | WEIGHT: 146 LBS | DIASTOLIC BLOOD PRESSURE: 78 MMHG | SYSTOLIC BLOOD PRESSURE: 123 MMHG | HEART RATE: 97 BPM

## 2024-03-07 DIAGNOSIS — N89.8 VAGINA ITCHING: Primary | ICD-10-CM

## 2024-03-07 DIAGNOSIS — N89.8 VAGINAL DISCHARGE: ICD-10-CM

## 2024-03-07 DIAGNOSIS — B37.31 VAGINAL CANDIDIASIS: ICD-10-CM

## 2024-03-07 PROBLEM — K02.9: Status: RESOLVED | Noted: 2023-08-17 | Resolved: 2024-03-07

## 2024-03-07 PROCEDURE — 87210 SMEAR WET MOUNT SALINE/INK: CPT | Performed by: NURSE PRACTITIONER

## 2024-03-07 PROCEDURE — 99213 OFFICE O/P EST LOW 20 MIN: CPT | Performed by: NURSE PRACTITIONER

## 2024-03-07 RX ORDER — FLUCONAZOLE 150 MG/1
150 TABLET ORAL
Qty: 2 TABLET | Refills: 0 | Status: SHIPPED | OUTPATIENT
Start: 2024-03-07 | End: 2024-03-11

## 2024-03-07 NOTE — PATIENT INSTRUCTIONS
Live por womack confianza en nuestro equipo.   Le agradecemos y agradecemos belen comentarios.   Si recibe jasiel encuesta nuestra, tómese unos momentos para informarnos cómo estamos.   Sinceramente,  MEMO Servin

## 2024-03-07 NOTE — PROGRESS NOTES
PROBLEM GYNECOLOGICAL VISIT    Teodora Oliver is a 49 y.o. female who presents today with complaint of vaginal itching.  Her general medical history has been reviewed and she reports it as follows:    Past Medical History:   Diagnosis Date    Abnormal Pap smear of cervix     2023 NILM pap/+ other HRHPV    Bell's palsy     managed with PT    Fibroid      Past Surgical History:   Procedure Laterality Date    AUGMENTATION MAMMAPLASTY Bilateral 2018    BREAST LUMPECTOMY Left 2013    benign     SECTION  , ,     ERCP W/ SPHICTEROTOMY N/A 2016    Procedure: ENDOSCOPIC RETROGRADE CHOLANGIOPANCREATOGRAPHY (ERCP) W/ SPHINCTEROTOMY;  Surgeon: Maria Fernanda Anna MD;  [facility];  Service:     MYOMECTOMY      [place] LAPAROSCOPY SURG CHOLECYSTECTOMY N/A 2016    Procedure: CHOLECYSTECTOMY LAPAROSCOPIC WITH INTRAOPERATIVE CHOLANGIOGRAM;  Surgeon: Misty Jose MD;  [facility];  Service: General     OB History          3    Para   3    Term   3       0    AB   0    Living   3         SAB   0    IAB   0    Ectopic   0    Multiple   0    Live Births   3               Social History     Tobacco Use    Smoking status: Never    Smokeless tobacco: Never   Vaping Use    Vaping status: Never Used   Substance Use Topics    Alcohol use: Yes     Comment: social.    Drug use: Never     Social History     Substance and Sexual Activity   Sexual Activity Yes    Partners: Male    Birth control/protection: OCP, Condom Male       Current Outpatient Medications   Medication Instructions    Azelastine HCl 137 MCG/SPRAY SOLN No dose, route, or frequency recorded.    Cholecalciferol 50 MCG ( [place]) CAPS 1 capsule, Daily    levonorgestrel-ethinyl estradiol (Altavera) 0.15-30 MG-MCG per tablet 1 tablet, Oral, Daily    Multiple Vitamins-Minerals (One Daily Multivit-Min Adult) TABS Oral       History of Present Illness:   Reports vaginal itching and white discharge for the past 3  days.  Denies vaginal odor or pelvic pain.    Review of Systems:  Review of Systems   Constitutional: Negative.    Gastrointestinal: Negative.    Genitourinary:  Positive for vaginal discharge (white) and vaginal pain (itching). Negative for pelvic pain.       Physical Exam:  /78   Pulse 97   Wt 66.2 kg (146 lb)   LMP 02/20/2024 (Approximate)   BMI 25.86 kg/m²   Physical Exam  Constitutional:       General: She is not in acute distress.  Genitourinary:      Vulva exam comments: Erythema of mucous membranes.      Vaginal discharge (white adherent) and erythema present.   Neurological:      Mental Status: She is alert.   Skin:     General: Skin is warm and dry.   Vitals reviewed.       Point of Care Testing:   -Wet mount: no clue cells, no trichomonads, few WBC's, pH=4.0   -KOH mount: + hyphae   -Whiff: negative      Assessment:   1. Vaginal candidiasis.    Plan:   1. Given Rx Diflucan.   2. Return to office as previously scheduled..   3. Patient's depression screening was assessed with a PHQ-2 score of 0. Their PHQ-9 score was 0. Clinically patient does not have depression. No treatment is required.

## 2024-05-28 ENCOUNTER — TELEPHONE (OUTPATIENT)
Dept: OBGYN CLINIC | Facility: CLINIC | Age: 49
End: 2024-05-28

## 2024-05-28 DIAGNOSIS — B37.31 VAGINAL CANDIDIASIS: Primary | ICD-10-CM

## 2024-05-28 RX ORDER — FLUCONAZOLE 150 MG/1
150 TABLET ORAL ONCE
Qty: 1 TABLET | Refills: 0 | Status: SHIPPED | OUTPATIENT
Start: 2024-05-28 | End: 2024-05-28

## 2024-05-28 NOTE — TELEPHONE ENCOUNTER
Rx sent as requested.  Please advise patient she does not need to come see me unless symptoms do not resolve within a week.

## 2024-06-27 ENCOUNTER — OFFICE VISIT (OUTPATIENT)
Dept: OBGYN CLINIC | Facility: CLINIC | Age: 49
End: 2024-06-27

## 2024-06-27 VITALS
WEIGHT: 149.2 LBS | DIASTOLIC BLOOD PRESSURE: 80 MMHG | HEART RATE: 92 BPM | SYSTOLIC BLOOD PRESSURE: 127 MMHG | BODY MASS INDEX: 26.43 KG/M2

## 2024-06-27 DIAGNOSIS — B37.31 VAGINAL CANDIDIASIS: ICD-10-CM

## 2024-06-27 DIAGNOSIS — N89.8 VAGINA ITCHING: Primary | ICD-10-CM

## 2024-06-27 PROCEDURE — 99213 OFFICE O/P EST LOW 20 MIN: CPT | Performed by: NURSE PRACTITIONER

## 2024-06-27 PROCEDURE — 87210 SMEAR WET MOUNT SALINE/INK: CPT | Performed by: NURSE PRACTITIONER

## 2024-06-27 RX ORDER — FLUCONAZOLE 150 MG/1
150 TABLET ORAL
Qty: 2 TABLET | Refills: 0 | Status: SHIPPED | OUTPATIENT
Start: 2024-06-27 | End: 2024-07-01

## 2024-06-27 NOTE — PROGRESS NOTES
PROBLEM GYNECOLOGICAL VISIT    Teodora Oliver is a 49 y.o. female who presents today with complaint of vaginal itching.  Her general medical history has been reviewed and she reports it as follows:    Past Medical History:   Diagnosis Date    Abnormal Pap smear of cervix     2023 NILM pap/+ other HRHPV    Bell's palsy     managed with PT    Fibroid      Past Surgical History:   Procedure Laterality Date    AUGMENTATION MAMMAPLASTY Bilateral 2018    BREAST LUMPECTOMY Left 2013    benign     SECTION  , ,     ERCP W/ SPHICTEROTOMY N/A 2016    Procedure: ENDOSCOPIC RETROGRADE CHOLANGIOPANCREATOGRAPHY (ERCP) W/ SPHINCTEROTOMY;  Surgeon: Maria Fernanda Anna MD;  [facility];  Service:     MYOMECTOMY      [place] LAPAROSCOPY SURG CHOLECYSTECTOMY N/A 2016    Procedure: CHOLECYSTECTOMY LAPAROSCOPIC WITH INTRAOPERATIVE CHOLANGIOGRAM;  Surgeon: Misty Jose MD;  [facility];  Service: General     OB History          3    Para   3    Term   3       0    AB   0    Living   3         SAB   0    IAB   0    Ectopic   0    Multiple   0    Live Births   3               Social History     Tobacco Use    Smoking status: Never    Smokeless tobacco: Never   Vaping Use    Vaping status: Never Used   Substance Use Topics    Alcohol use: Yes     Comment: social.    Drug use: Never     Social History     Substance and Sexual Activity   Sexual Activity Yes    Partners: Male    Birth control/protection: OCP, Condom Male       Current Outpatient Medications   Medication Instructions    Azelastine HCl 137 MCG/SPRAY SOLN No dose, route, or frequency recorded.    levonorgestrel-ethinyl estradiol (Altavera) 0.15-30 MG-MCG per tablet 1 tablet, Oral, Daily    Multiple Vitamins-Minerals (One Daily Multivit-Min Adult) TABS Oral       History of Present Illness:   Reports intense vulvar itching and rash for the past month and a half.  Also reports a small mount of white vaginal  discharge.  Denies vaginal odor.  Denies pelvic pain.  She was treated with Diflucan x1 dose on 5/28/24 and reports that symptoms did not resolve.    Review of Systems:  Review of Systems   Constitutional: Negative.    Gastrointestinal: Negative.    Genitourinary:  Positive for vaginal discharge (small amount white) and vaginal pain (vulvar itching). Negative for dysuria and pelvic pain.       Physical Exam:  /80 (BP Location: Right arm, Patient Position: Sitting, Cuff Size: Standard)   Pulse 92   Wt 67.7 kg (149 lb 3.2 oz)   LMP 06/20/2024 (Approximate)   BMI 26.43 kg/m²   Physical Exam  Constitutional:       General: She is not in acute distress.  Genitourinary:      Right Labia: skin changes (erythema).      Left Labia: skin changes (erythema).              Vaginal discharge (small amount white adherent) present.      No vaginal erythema.   Abdominal:      Palpations: Abdomen is soft.      Tenderness: There is no abdominal tenderness.   Neurological:      Mental Status: She is alert.   Skin:     General: Skin is warm and dry.   Vitals reviewed.       Point of Care Testing:   -Wet mount: no clue cells, no trichomonads, few WBC's, pH=4.0   -KOH mount: + hyphae   -Whiff: negative    Assessment:   1. Vaginal Candidiasis.    Plan:   1. Given Rx Diflucan x2 doses and topical Mycolog for external vulvar use.   2. Return to office as previously scheduled for annual GYN exam in 8/2024.   3. Patient's depression screening was assessed with a PHQ-2 score of 0. Their PHQ-9 score was 1. Clinically patient does not have depression. No treatment is required.

## 2024-08-14 ENCOUNTER — TELEPHONE (OUTPATIENT)
Dept: OBGYN CLINIC | Facility: CLINIC | Age: 49
End: 2024-08-14

## 2024-08-14 NOTE — TELEPHONE ENCOUNTER
"Patient called and left voicemail:     \"Good morning. My name is Renée Beth. I am calling to remove an appointment. My phone is [phone]. Thanks a lot.\"    Patient was calling to reschedule upcoming yearly due to having and immigration appointment. Called patient back and moved appointment up to 8:00am.     "

## 2024-08-16 ENCOUNTER — ANNUAL EXAM (OUTPATIENT)
Dept: OBGYN CLINIC | Facility: CLINIC | Age: 49
End: 2024-08-16

## 2024-08-16 ENCOUNTER — APPOINTMENT (OUTPATIENT)
Dept: LAB | Facility: CLINIC | Age: 49
End: 2024-08-16

## 2024-08-16 ENCOUNTER — TELEPHONE (OUTPATIENT)
Dept: OBGYN CLINIC | Facility: CLINIC | Age: 49
End: 2024-08-16

## 2024-08-16 VITALS
HEIGHT: 63 IN | HEART RATE: 78 BPM | DIASTOLIC BLOOD PRESSURE: 75 MMHG | WEIGHT: 153 LBS | BODY MASS INDEX: 27.11 KG/M2 | SYSTOLIC BLOOD PRESSURE: 123 MMHG

## 2024-08-16 DIAGNOSIS — Z12.4 SCREENING FOR CERVICAL CANCER: ICD-10-CM

## 2024-08-16 DIAGNOSIS — Z11.3 SCREEN FOR STD (SEXUALLY TRANSMITTED DISEASE): ICD-10-CM

## 2024-08-16 DIAGNOSIS — Z11.51 SCREENING FOR HPV (HUMAN PAPILLOMAVIRUS): ICD-10-CM

## 2024-08-16 DIAGNOSIS — Z12.39 ENCOUNTER FOR BREAST CANCER SCREENING USING NON-MAMMOGRAM MODALITY: ICD-10-CM

## 2024-08-16 DIAGNOSIS — Z01.419 ENCOUNTER FOR GYNECOLOGICAL EXAMINATION WITHOUT ABNORMAL FINDING: Primary | ICD-10-CM

## 2024-08-16 DIAGNOSIS — Z12.31 ENCOUNTER FOR SCREENING MAMMOGRAM FOR MALIGNANT NEOPLASM OF BREAST: ICD-10-CM

## 2024-08-16 DIAGNOSIS — Z30.09 UNWANTED FERTILITY: ICD-10-CM

## 2024-08-16 DIAGNOSIS — Z91.89 INCREASED RISK OF BREAST CANCER: ICD-10-CM

## 2024-08-16 LAB
HIV 1+2 AB+HIV1 P24 AG SERPL QL IA: NORMAL
HIV 2 AB SERPL QL IA: NORMAL
HIV1 AB SERPL QL IA: NORMAL
HIV1 P24 AG SERPL QL IA: NORMAL
TREPONEMA PALLIDUM IGG+IGM AB [PRESENCE] IN SERUM OR PLASMA BY IMMUNOASSAY: NORMAL

## 2024-08-16 PROCEDURE — 87340 HEPATITIS B SURFACE AG IA: CPT

## 2024-08-16 PROCEDURE — 86780 TREPONEMA PALLIDUM: CPT

## 2024-08-16 PROCEDURE — 87591 N.GONORRHOEAE DNA AMP PROB: CPT | Performed by: NURSE PRACTITIONER

## 2024-08-16 PROCEDURE — 36415 COLL VENOUS BLD VENIPUNCTURE: CPT

## 2024-08-16 PROCEDURE — G0476 HPV COMBO ASSAY CA SCREEN: HCPCS | Performed by: NURSE PRACTITIONER

## 2024-08-16 PROCEDURE — 99396 PREV VISIT EST AGE 40-64: CPT | Performed by: NURSE PRACTITIONER

## 2024-08-16 PROCEDURE — 86803 HEPATITIS C AB TEST: CPT

## 2024-08-16 PROCEDURE — 87389 HIV-1 AG W/HIV-1&-2 AB AG IA: CPT

## 2024-08-16 PROCEDURE — 87491 CHLMYD TRACH DNA AMP PROBE: CPT | Performed by: NURSE PRACTITIONER

## 2024-08-16 PROCEDURE — G0145 SCR C/V CYTO,THINLAYER,RESCR: HCPCS | Performed by: NURSE PRACTITIONER

## 2024-08-16 RX ORDER — LEVONORGESTREL AND ETHINYL ESTRADIOL 0.15-0.03
1 KIT ORAL DAILY
Qty: 84 TABLET | Refills: 4 | Status: SHIPPED | OUTPATIENT
Start: 2024-08-16

## 2024-08-16 NOTE — TELEPHONE ENCOUNTER
That is fine, but please tell her to also schedule the breast ultrasound with them as well.  I want her to have BOTH.

## 2024-08-16 NOTE — LETTER
2024    To Teodora Oliver  : 1975      Esta carta es para informarle que belen resultados recientes de ANÁLISIS DE SHAILESH para Enfermedades de Transmisión Sexual (VIH, hepatitis B, hepatitis C, y sífilis) fueron revisados por mí y son NORMALES.  Póngase en contacto con nuestra oficina para jasiel trish si tiene alguna inquietud adicional.    MEMO Servin

## 2024-08-16 NOTE — PROGRESS NOTES
ANNUAL GYNECOLOGICAL EXAMINATION    Teodora Oliver is a 49 y.o. female who presents today for annual GYN exam.  Her last pap smear was performed 2023 and result was NILM with + other HRHPV.  Her last mammogram was performed 10/25/2023 and result was WNL but lifetime Tyrer-Cuzick score is calculated as 25.68%.  She had colon cancer screening performed 9/15/2023 via colonoscopy and was WNL and repeat was recommended in 10 years.  She had HIV screening performed 2023 and it was negative.  She reports menses as regular.  Patient's last menstrual period was 2024 (exact date).  Her general medical history has been reviewed and she reports it as follows:    Past Medical History:   Diagnosis Date    Abnormal Pap smear of cervix     2023 NILM pap/+ other HRHPV    Bell's palsy 2020    managed with PT    Fibroid      Past Surgical History:   Procedure Laterality Date    AUGMENTATION MAMMAPLASTY Bilateral 2018    BREAST LUMPECTOMY Left 2013    benign     SECTION  2004, ,     ERCP W/ SPHICTEROTOMY N/A 2016    Procedure: ENDOSCOPIC RETROGRADE CHOLANGIOPANCREATOGRAPHY (ERCP) W/ SPHINCTEROTOMY;  Surgeon: Maria Fernanda Anna MD;  [facility];  Service:     MYOMECTOMY      [place] LAPAROSCOPY SURG CHOLECYSTECTOMY N/A 2016    Procedure: CHOLECYSTECTOMY LAPAROSCOPIC WITH INTRAOPERATIVE CHOLANGIOGRAM;  Surgeon: Misty Jose MD;  [facility];  Service: General     OB History          3    Para   3    Term   3       0    AB   0    Living   3         SAB   0    IAB   0    Ectopic   0    Multiple   0    Live Births   3               Social History     Tobacco Use    Smoking status: Never    Smokeless tobacco: Never   Vaping Use    Vaping status: Never Used   Substance Use Topics    Alcohol use: Yes     Comment: social.    Drug use: Never     Social History     Substance and Sexual Activity   Sexual Activity Yes    Partners: Male    Birth control/protection:  "OCP, Condom Male     Cancer-related family history includes Breast cancer (age of onset: 50) in her mother and paternal grandmother; Cancer in her father. There is no history of Colon cancer or Ovarian cancer.    Current Outpatient Medications   Medication Instructions    Azelastine HCl 137 MCG/SPRAY SOLN No dose, route, or frequency recorded.    levonorgestrel-ethinyl estradiol (Altavera) 0.15-30 MG-MCG per tablet 1 tablet, Oral, Daily    Multiple Vitamins-Minerals (One Daily Multivit-Min Adult) TABS Oral    nystatin-triamcinolone (MYCOLOG-II) cream Topical, 3 times daily PRN       Review of Systems:  Review of Systems   Constitutional: Negative.    Gastrointestinal: Negative.    Genitourinary:  Negative for difficulty urinating, menstrual problem, pelvic pain and vaginal discharge.   Skin: Negative.        Physical Exam:  /75 (BP Location: Left arm, Patient Position: Sitting, Cuff Size: Standard)   Pulse 78   Ht 5' 3\" (1.6 m)   Wt 69.4 kg (153 lb)   LMP 07/21/2024 (Exact Date)   BMI 27.10 kg/m²   Physical Exam  Constitutional:       General: She is not in acute distress.     Appearance: She is well-developed.   Genitourinary:      Vulva normal.      No lesions in the vagina.        Right Adnexa: not tender and no mass present.     Left Adnexa: not tender and no mass present.     No cervical motion tenderness or lesion.      Uterus is not tender.   Breasts:     Right: Breast implant present. No mass, nipple discharge, skin change or tenderness.      Left: Breast implant present. No mass, nipple discharge, skin change or tenderness.   Neck:      Thyroid: No thyromegaly.   Cardiovascular:      Rate and Rhythm: Normal rate and regular rhythm.   Pulmonary:      Effort: Pulmonary effort is normal.   Abdominal:      Palpations: Abdomen is soft.      Tenderness: There is no abdominal tenderness.   Musculoskeletal:      Cervical back: Neck supple.   Neurological:      Mental Status: She is alert and oriented to " person, place, and time.   Skin:     General: Skin is warm and dry.   Vitals reviewed.       Assessment/Plan:   1. Normal well-woman GYN exam.  2. Cervical cancer screening:  Normal cervical exam.  Pap smear done with HPV co-testing.  Has not received HPV vaccine in the past and she is beyond the age of recommended administration.    3. STD screening:  Orders placed for vaginal GC/CT cultures.  Orders placed for serum anti-HIV, anti-HCV, HbsAg, syphilis panel.   4. Breast cancer screening:  Normal breast exam but increased risk for breast CA.  Order placed for bilateral screening mammogram and ABUS.  Referred ordered for consultation with [facility].  Reviewed breast self-awareness.   5. Colon cancer screening:  Up to date.   6. Depression Screening: Patient's depression screening was assessed with a PHQ-2 score of 0. Their PHQ-9 score was 0. Clinically patient does not have depression. No treatment is required.    7. BMI Counseling: Body mass index is 27.1 kg/m².  No intervention indicated.   8. Contraception:  OCP's.  Rx refills for another year sent to pharmacy.   9. Return to office in 1 year for annual GYN exam.

## 2024-08-16 NOTE — TELEPHONE ENCOUNTER
Patient has mammo already scheduled for November in [facility]. Patient prefers [facility] for mammos. Patient stated she forgot to mention at visit today.

## 2024-08-16 NOTE — LETTER
2024    To Teodora Pena  : 1975      Esta carta es para informarle que belen CULTURAS recientes para la gonorrea y la clamidia fueron revisadas por mí y son NORMALES.  Comuníquese con la oficina para jasiel trish si tiene alguna inquietud adicional.    MEMO Servin

## 2024-08-17 LAB
HBV SURFACE AG SER QL: NORMAL
HCV AB SER QL: NORMAL

## 2024-08-19 ENCOUNTER — TELEPHONE (OUTPATIENT)
Facility: HOSPITAL | Age: 49
End: 2024-08-19

## 2024-08-20 LAB
C TRACH DNA SPEC QL NAA+PROBE: NEGATIVE
N GONORRHOEA DNA SPEC QL NAA+PROBE: NEGATIVE

## 2024-08-22 ENCOUNTER — TELEPHONE (OUTPATIENT)
Dept: OBGYN CLINIC | Facility: CLINIC | Age: 49
End: 2024-08-22

## 2024-08-22 LAB
LAB AP GYN PRIMARY INTERPRETATION: NORMAL
Lab: NORMAL

## 2024-08-22 NOTE — TELEPHONE ENCOUNTER
Please contact patient in Greenlandic and advise her that pap smear was normal but other HRHPV continues to be positive from last year and I now recommend that she have a colposcopy with biopsy.  Please assist her with scheduling.

## 2024-09-03 ENCOUNTER — TELEPHONE (OUTPATIENT)
Facility: HOSPITAL | Age: 49
End: 2024-09-03

## 2024-09-03 NOTE — TELEPHONE ENCOUNTER
Telephone Call    [] Called Patient regarding [facility]; Patient answered call and wants to enroll; Asked for a call back.    [] Called Patient regarding [facility]; Patient answered call; Declined to enroll in program.    [] Called Patient regarding [facility]; Patient did not ; Left voicemail with my name and direct phone number.     [x] Called Patient regarding [facility]; Patient did not ; Unable to leave voicemail.    [] Called Patient regarding [facility]; Phone number is invalid    Attempts (Max 3): []1   [x]2   []3        Additional Notes:

## 2024-09-05 ENCOUNTER — TELEPHONE (OUTPATIENT)
Facility: HOSPITAL | Age: 49
End: 2024-09-05

## 2024-09-05 NOTE — TELEPHONE ENCOUNTER
Telephone Call    [] Called Patient regarding [facility]; Patient answered call and wants to enroll; Asked for a call back.    [] Called Patient regarding [facility]; Patient answered call; Declined to enroll in program.    [x] Called Patient regarding [facility]; Patient did not ; Left voicemail with my name and direct phone number.     [] Called Patient regarding [facility]; Patient did not ; Unable to leave voicemail.    [] Called Patient regarding [facility]; Phone number is invalid    Attempts (Max 3): []1   []2   [x]3        Additional Notes:

## 2024-09-10 ENCOUNTER — TELEPHONE (OUTPATIENT)
Dept: OBGYN CLINIC | Facility: CLINIC | Age: 49
End: 2024-09-10

## 2024-09-10 DIAGNOSIS — B37.31 VAGINAL CANDIDIASIS: Primary | ICD-10-CM

## 2024-09-11 ENCOUNTER — TELEPHONE (OUTPATIENT)
Dept: OBGYN CLINIC | Facility: CLINIC | Age: 49
End: 2024-09-11

## 2024-09-11 RX ORDER — FLUCONAZOLE 150 MG/1
150 TABLET ORAL ONCE
Qty: 1 TABLET | Refills: 0 | Status: SHIPPED | OUTPATIENT
Start: 2024-09-11 | End: 2024-09-11

## 2024-09-11 NOTE — TELEPHONE ENCOUNTER
I will send 1 refill Diflucan to pharmacy.  If she continues to have vaginal itching after 3-4 days, she will need to come in for evaluation.

## 2024-11-07 ENCOUNTER — PROCEDURE VISIT (OUTPATIENT)
Dept: OBGYN CLINIC | Facility: CLINIC | Age: 49
End: 2024-11-07

## 2024-11-07 VITALS
WEIGHT: 156.6 LBS | DIASTOLIC BLOOD PRESSURE: 67 MMHG | SYSTOLIC BLOOD PRESSURE: 112 MMHG | HEART RATE: 97 BPM | BODY MASS INDEX: 27.75 KG/M2 | HEIGHT: 63 IN

## 2024-11-07 DIAGNOSIS — R87.810 CERVICAL HIGH RISK HUMAN PAPILLOMAVIRUS (HPV) DNA TEST POSITIVE: Primary | ICD-10-CM

## 2024-11-07 LAB — SL AMB POCT URINE HCG: NEGATIVE

## 2024-11-07 PROCEDURE — 57454 BX/CURETT OF CERVIX W/SCOPE: CPT | Performed by: OBSTETRICS & GYNECOLOGY

## 2024-11-07 PROCEDURE — 88305 TISSUE EXAM BY PATHOLOGIST: CPT | Performed by: STUDENT IN AN ORGANIZED HEALTH CARE EDUCATION/TRAINING PROGRAM

## 2024-11-07 PROCEDURE — 81025 URINE PREGNANCY TEST: CPT | Performed by: OBSTETRICS & GYNECOLOGY

## 2024-11-07 NOTE — PATIENT INSTRUCTIONS
Live por womack confianza en nuestro equipo.   Le agradecemos y agradecemos belen comentarios.   Si recibe jasiel encuesta nuestra, tómese unos momentos para informarnos cómo estamos.   Sinceramente,  Yaarianae Toussaint-Foster, DO

## 2024-11-07 NOTE — PROGRESS NOTES
Colposcopy    Date/Time: 11/7/2024 2:30 PM    Performed by: Yardlie Toussaint-Foster, DO  Authorized by: Yardlie Toussaint-Foster, DO    Verbal consent obtained?: Yes    Written consent obtained?: Yes    Risks and benefits: Risks, benefits and alternatives were discussed    Consent given by:  Patient  Time Out:     Time out: Immediately prior to the procedure a time out was called      Time out performed at:  11/7/2024 2:14 PM  Patient states understanding of procedure being performed: Yes    Patient's understanding of procedure matches consent: Yes    Procedure consent matches procedure scheduled: Yes    Relevant documents present and verified: Yes    Test results available and properly labeled: Yes    Patient identity confirmed:  Verbally with patient and provided demographic data  Pre-procedure:     Prepped with: acetic acid    Indication:     Indications: Persisten HPV HR.  Procedure:     Procedure: Colposcopy w/ cervical biopsy and ECC      Under satisfactory analgesia the patient was prepped and draped in the dorsal lithotomy position: yes      Gadsden speculum was placed in the vagina: yes      Under colposcopic examination the transition zone was seen in entirety: no      Endocervix was curetted using a Kevorkian curette: yes      Cervical biopsy performed with a cervical biopsy punch: yes      Monsel's solution was applied: yes      Specimen(s) to pathology: yes    Post-procedure:     Findings: White epithelium      Impression: Low grade cervical dysplasia      Patient tolerance of procedure:  Tolerated well, no immediate complications

## 2024-11-12 ENCOUNTER — TELEPHONE (OUTPATIENT)
Dept: OBGYN CLINIC | Facility: CLINIC | Age: 49
End: 2024-11-12

## 2024-11-12 DIAGNOSIS — N63.0 MASS OF BREAST, UNSPECIFIED LATERALITY: Primary | ICD-10-CM

## 2024-11-12 PROCEDURE — 88305 TISSUE EXAM BY PATHOLOGIST: CPT | Performed by: STUDENT IN AN ORGANIZED HEALTH CARE EDUCATION/TRAINING PROGRAM

## 2024-11-12 NOTE — TELEPHONE ENCOUNTER
Please advise patient in Mozambican that her recent AUTOMATED breast ultrasound noted masses which should be evaluated with diagnostic mammogram.  I am placing the order and she should schedule ASAP.

## 2024-11-26 ENCOUNTER — OFFICE VISIT (OUTPATIENT)
Dept: OBGYN CLINIC | Facility: CLINIC | Age: 49
End: 2024-11-26

## 2024-11-26 VITALS
SYSTOLIC BLOOD PRESSURE: 112 MMHG | BODY MASS INDEX: 27.71 KG/M2 | WEIGHT: 156.4 LBS | DIASTOLIC BLOOD PRESSURE: 74 MMHG | HEART RATE: 93 BPM

## 2024-11-26 DIAGNOSIS — N89.8 VAGINAL DISCHARGE: ICD-10-CM

## 2024-11-26 DIAGNOSIS — N89.8 VAGINA ITCHING: Primary | ICD-10-CM

## 2024-11-26 DIAGNOSIS — B37.31 VAGINAL CANDIDIASIS: ICD-10-CM

## 2024-11-26 PROCEDURE — 99213 OFFICE O/P EST LOW 20 MIN: CPT | Performed by: NURSE PRACTITIONER

## 2024-11-26 RX ORDER — FLUCONAZOLE 150 MG/1
150 TABLET ORAL
Qty: 2 TABLET | Refills: 0 | Status: SHIPPED | OUTPATIENT
Start: 2024-11-26 | End: 2024-11-30

## 2024-11-26 NOTE — PROGRESS NOTES
PROBLEM GYNECOLOGICAL VISIT    Teodora Oliver is a 49 y.o. female who presents today with complaint of vaginal discharge and itching.  Her general medical history has been reviewed and she reports it as follows:    Past Medical History:   Diagnosis Date    Abnormal Pap smear of cervix     2023 NILM pap/+ other HRHPV; 2024 NILM pap/+ other HRHPV; 2024 colpo neg    Bell's palsy 2020    managed with PT    Fibroid      Past Surgical History:   Procedure Laterality Date    AUGMENTATION MAMMAPLASTY Bilateral 2018    BREAST LUMPECTOMY Left 2013    benign     SECTION  2004, ,     ERCP W/ SPHICTEROTOMY N/A 2016    Procedure: ENDOSCOPIC RETROGRADE CHOLANGIOPANCREATOGRAPHY (ERCP) W/ SPHINCTEROTOMY;  Surgeon: Maria Fernanda Anna MD;  Location: [facility];  Service:     MYOMECTOMY      FL LAPAROSCOPY SURG CHOLECYSTECTOMY N/A 2016    Procedure: CHOLECYSTECTOMY LAPAROSCOPIC WITH INTRAOPERATIVE CHOLANGIOGRAM;  Surgeon: Misty Jose MD;  Location: [facility];  Service: General     OB History          3    Para   3    Term   3       0    AB   0    Living   3         SAB   0    IAB   0    Ectopic   0    Multiple   0    Live Births   3               Social History     Tobacco Use    Smoking status: Never    Smokeless tobacco: Never   Vaping Use    Vaping status: Never Used   Substance Use Topics    Alcohol use: Yes     Comment: social.    Drug use: Never     Social History     Substance and Sexual Activity   Sexual Activity Yes    Partners: Male    Birth control/protection: OCP, Condom Male       Current Outpatient Medications   Medication Instructions    Azelastine HCl 137 MCG/SPRAY SOLN No dose, route, or frequency recorded.    levonorgestrel-ethinyl estradiol (Altavera) 0.15-30 MG-MCG per tablet 1 tablet, Oral, Daily    Multiple Vitamins-Minerals (One Daily Multivit-Min Adult) TABS Take by mouth    nystatin-triamcinolone (MYCOLOG-II) cream Topical, 3 times daily PRN        History of Present Illness:   Reports for the past 2 days she has been experiencing vaginal discharge and itching.  Denies vaginal odor or pelvic pain.    Review of Systems:  Review of Systems   Constitutional: Negative.    Gastrointestinal: Negative.    Genitourinary:  Positive for vaginal discharge and vaginal pain (itching). Negative for dysuria and pelvic pain.       Physical Exam:  /74 (BP Location: Left arm, Patient Position: Sitting, Cuff Size: Standard)   Pulse 93   Wt 70.9 kg (156 lb 6.4 oz)   LMP 11/15/2024 (Exact Date)   BMI 27.71 kg/m²   Physical Exam  Constitutional:       General: She is not in acute distress.  Genitourinary:      Vulva exam comments: Erythema of mucous membranes.      Vaginal discharge (white, adherent) and erythema present.   Abdominal:      Palpations: Abdomen is soft.      Tenderness: There is no abdominal tenderness.   Neurological:      Mental Status: She is alert.   Skin:     General: Skin is warm and dry.   Vitals reviewed.       Assessment:   1. Vaginal Candidiasis.    Plan:   1. Given Rx Diflucan.   2. Return to office as previously scheduled for annual GYN exam.   3. Patient's depression screening was assessed with a PHQ-2 score of 0. Clinically patient does not have depression. No treatment is required.

## 2024-12-03 ENCOUNTER — OFFICE VISIT (OUTPATIENT)
Dept: OBGYN CLINIC | Facility: CLINIC | Age: 49
End: 2024-12-03

## 2024-12-03 VITALS
SYSTOLIC BLOOD PRESSURE: 132 MMHG | HEART RATE: 102 BPM | DIASTOLIC BLOOD PRESSURE: 77 MMHG | WEIGHT: 155 LBS | BODY MASS INDEX: 27.46 KG/M2

## 2024-12-03 DIAGNOSIS — Z71.2 ENCOUNTER TO DISCUSS TEST RESULTS: Primary | ICD-10-CM

## 2024-12-03 PROCEDURE — 99213 OFFICE O/P EST LOW 20 MIN: CPT | Performed by: OBSTETRICS & GYNECOLOGY

## 2024-12-04 NOTE — PROGRESS NOTES
PROBLEM GYNECOLOGICAL VISIT    Teodora Oliver is a 49 y.o. female who presents today for results.  Her general medical history has been reviewed and she reports it as follows:    Past Medical History:   Diagnosis Date    Abnormal Pap smear of cervix     2023 NILM pap/+ other HRHPV; 2024 NILM pap/+ other HRHPV; 2024 colpo neg    Bell's palsy 2020    managed with PT    Fibroid      Past Surgical History:   Procedure Laterality Date    AUGMENTATION MAMMAPLASTY Bilateral 2018    BREAST LUMPECTOMY Left 2013    benign     SECTION  , ,     ERCP W/ SPHICTEROTOMY N/A 2016    Procedure: ENDOSCOPIC RETROGRADE CHOLANGIOPANCREATOGRAPHY (ERCP) W/ SPHINCTEROTOMY;  Surgeon: Maria Fernanda Anna MD;  Location: [facility];  Service:     MYOMECTOMY      AR LAPAROSCOPY SURG CHOLECYSTECTOMY N/A 2016    Procedure: CHOLECYSTECTOMY LAPAROSCOPIC WITH INTRAOPERATIVE CHOLANGIOGRAM;  Surgeon: Misty Jose MD;  Location: [facility];  Service: General     OB History          3    Para   3    Term   3       0    AB   0    Living   3         SAB   0    IAB   0    Ectopic   0    Multiple   0    Live Births   3               Social History     Tobacco Use    Smoking status: Never    Smokeless tobacco: Never   Vaping Use    Vaping status: Never Used   Substance Use Topics    Alcohol use: Yes     Comment: social.    Drug use: Never     Social History     Substance and Sexual Activity   Sexual Activity Yes    Partners: Male    Birth control/protection: OCP, Condom Male       Current Outpatient Medications   Medication Instructions    Azelastine HCl 137 MCG/SPRAY SOLN No dose, route, or frequency recorded.    levonorgestrel-ethinyl estradiol (Altavera) 0.15-30 MG-MCG per tablet 1 tablet, Oral, Daily    Multiple Vitamins-Minerals (One Daily Multivit-Min Adult) TABS Take by mouth    nystatin-triamcinolone (MYCOLOG-II) cream Topical, 3 times daily PRN       History of Present Illness:   Patient  presents for follow up s/p colposcopy for persistent HPV HR positive with no complaints.    Review of Systems:  Review of Systems   Genitourinary:  Negative for menstrual problem, pelvic pain, vaginal bleeding and vaginal discharge.   All other systems reviewed and are negative.      Physical Exam:  /77 (BP Location: Left arm, Patient Position: Sitting, Cuff Size: Standard)   Pulse 102   Wt 70.3 kg (155 lb)   LMP 11/15/2024 (Exact Date)   BMI 27.46 kg/m²   Physical Exam  Constitutional:       Appearance: Normal appearance.   Neurological:      Mental Status: She is alert.   Vitals reviewed.     Discussion:  Discuss with patient pathology consist of inconclusive findings. Recommend a LEEP not only for inconclusive findings and the colposcopy was unsatisfactory since the transition zone was not seen during the procedure.  Discuss with patient the procedure and that she would be prescribed valium date prior to procedure to take 2 hrs before coming and that she would need a . Risk and benefits discussed.  Consent signed for procedure.     Assessment:   1. Persistent HPV HR   2. Inconclusive results   3. Unsatisfactory colposcopy    Plan:   1. Consent signed for LEEP   2. Return to office 1/24/25 for procedure.     Reviewed with patient that [facility], but that they will not necessarily be reviewed by me immediately.  Explained that I will review results at my earliest opportunity and contact patient appropriately.

## 2024-12-30 ENCOUNTER — TELEPHONE (OUTPATIENT)
Dept: OBGYN CLINIC | Facility: CLINIC | Age: 49
End: 2024-12-30

## 2024-12-30 DIAGNOSIS — B37.31 VAGINAL CANDIDIASIS: Primary | ICD-10-CM

## 2024-12-30 RX ORDER — FLUCONAZOLE 150 MG/1
150 TABLET ORAL ONCE
Qty: 1 TABLET | Refills: 0 | Status: SHIPPED | OUTPATIENT
Start: 2024-12-30 | End: 2024-12-30

## 2025-02-24 ENCOUNTER — TELEPHONE (OUTPATIENT)
Dept: OBGYN CLINIC | Facility: CLINIC | Age: 50
End: 2025-02-24

## 2025-02-24 DIAGNOSIS — B37.31 VAGINAL CANDIDIASIS: Primary | ICD-10-CM

## 2025-02-24 RX ORDER — FLUCONAZOLE 150 MG/1
150 TABLET ORAL ONCE
Qty: 1 TABLET | Refills: 0 | Status: SHIPPED | OUTPATIENT
Start: 2025-02-24 | End: 2025-02-24

## 2025-02-24 NOTE — TELEPHONE ENCOUNTER
Rx sent as requested.  Please advise her if symptoms not resolved within 1 week, she should come in for evaluation with me.

## 2025-02-25 NOTE — TELEPHONE ENCOUNTER
PT called back and I explained medication sent and if symptoms not resolved in a week she needs to make an appointment.

## 2025-03-25 ENCOUNTER — TELEPHONE (OUTPATIENT)
Dept: OBGYN CLINIC | Facility: CLINIC | Age: 50
End: 2025-03-25

## 2025-03-25 DIAGNOSIS — B37.31 VAGINAL CANDIDIASIS: Primary | ICD-10-CM

## 2025-03-25 RX ORDER — FLUCONAZOLE 150 MG/1
150 TABLET ORAL ONCE
Qty: 1 TABLET | Refills: 2 | Status: SHIPPED | OUTPATIENT
Start: 2025-03-25 | End: 2025-03-25

## 2025-03-25 NOTE — TELEPHONE ENCOUNTER
Patient called because she is having yeast infection symptoms and would like refills for fluconazole (DIFLUCAN) 150 mg tablet [[id number]].

## 2025-03-28 NOTE — TELEPHONE ENCOUNTER
Pt called to verify if she has any more refills of this RX. Advised she has 2 more refills. PT verbalized understanding.

## 2025-04-10 ENCOUNTER — TELEPHONE (OUTPATIENT)
Dept: OBGYN CLINIC | Facility: CLINIC | Age: 50
End: 2025-04-10

## 2025-04-10 DIAGNOSIS — R87.810 CERVICAL HIGH RISK HUMAN PAPILLOMAVIRUS (HPV) DNA TEST POSITIVE: ICD-10-CM

## 2025-04-10 RX ORDER — DIAZEPAM 10 MG/1
TABLET ORAL
Qty: 1 TABLET | Refills: 0 | Status: SHIPPED | OUTPATIENT
Start: 2025-04-10

## 2025-04-23 ENCOUNTER — OFFICE VISIT (OUTPATIENT)
Dept: OBGYN CLINIC | Facility: CLINIC | Age: 50
End: 2025-04-23

## 2025-04-23 VITALS
HEIGHT: 63 IN | SYSTOLIC BLOOD PRESSURE: 100 MMHG | DIASTOLIC BLOOD PRESSURE: 70 MMHG | BODY MASS INDEX: 27.18 KG/M2 | WEIGHT: 153.4 LBS

## 2025-04-23 DIAGNOSIS — B37.9 YEAST INFECTION: Primary | ICD-10-CM

## 2025-04-23 LAB
BV WHIFF TEST VAG QL: NEGATIVE
CLUE CELLS SPEC QL WET PREP: NEGATIVE
PH SMN: 4.5 [PH]
SL AMB POCT WET MOUNT: ABNORMAL
T VAGINALIS VAG QL WET PREP: NEGATIVE
YEAST VAG QL WET PREP: POSITIVE

## 2025-04-23 PROCEDURE — 87210 SMEAR WET MOUNT SALINE/INK: CPT | Performed by: NURSE PRACTITIONER

## 2025-04-23 PROCEDURE — 99213 OFFICE O/P EST LOW 20 MIN: CPT | Performed by: NURSE PRACTITIONER

## 2025-04-23 RX ORDER — FLUCONAZOLE 150 MG/1
150 TABLET ORAL ONCE
Qty: 1 TABLET | Refills: 1 | Status: SHIPPED | OUTPATIENT
Start: 2025-04-23 | End: 2025-04-23

## 2025-04-23 NOTE — PATIENT INSTRUCTIONS
Take Fluconazole as directed  Wear loose clothes and cotton underwear  Call with needs or concerns  Return for annual GYN exam after 8/16/2025

## 2025-04-23 NOTE — PROGRESS NOTES
"Assessment/Plan:     Diagnoses and all orders for this visit:    Yeast infection  -     fluconazole (DIFLUCAN) 150 mg tablet; Take 1 tablet (150 mg total) by mouth once for 1 dose  -     POCT wet mount    Plan       Subjective:      Patient ID: Teodora Oliver is a 50 y.o. female.    HPI  Pt presents with 5 days of vaginal discharge and vulvar itching  8/16/2024 WNL PAP and persistent HPV positive    Explained wet mount was positive for yeast, safe and effective use of Fluconazole was provided, discussed comfort measures      The following portions of the patient's history were reviewed and updated as appropriate: allergies, current medications, past family history, past medical history, past social history, past surgical history and problem list.    Review of Systems    .Pertinent items are note in the HPI      Objective:      /70 (BP Location: Left arm, Patient Position: Sitting)   Ht 5' 3\" (1.6 m)   Wt 69.6 kg (153 lb 6.4 oz)   LMP 04/06/2025 (Exact Date)   BMI 27.17 kg/m²          Physical Exam    Alert and oriented  WNL respiratory effort, negative cough or SOB  Vulva negative lesions, slight erythema  Vagina erythema, positive thick white discharge   Cervix positive thick white discharge, negative lesions  Wet mount positive for yeast  "

## 2025-05-23 ENCOUNTER — PROCEDURE VISIT (OUTPATIENT)
Dept: OBGYN CLINIC | Facility: CLINIC | Age: 50
End: 2025-05-23

## 2025-05-23 VITALS
BODY MASS INDEX: 27.32 KG/M2 | DIASTOLIC BLOOD PRESSURE: 68 MMHG | HEIGHT: 63 IN | SYSTOLIC BLOOD PRESSURE: 118 MMHG | WEIGHT: 154.2 LBS

## 2025-05-23 DIAGNOSIS — R87.810 CERVICAL HIGH RISK HUMAN PAPILLOMAVIRUS (HPV) DNA TEST POSITIVE: Primary | ICD-10-CM

## 2025-05-23 PROCEDURE — 88307 TISSUE EXAM BY PATHOLOGIST: CPT | Performed by: PATHOLOGY

## 2025-05-23 PROCEDURE — 81025 URINE PREGNANCY TEST: CPT | Performed by: OBSTETRICS & GYNECOLOGY

## 2025-05-23 PROCEDURE — 88344 IMHCHEM/IMCYTCHM EA MLT ANTB: CPT | Performed by: PATHOLOGY

## 2025-05-23 PROCEDURE — 57522 CONIZATION OF CERVIX: CPT | Performed by: OBSTETRICS & GYNECOLOGY

## 2025-05-23 RX ORDER — LIDOCAINE HYDROCHLORIDE AND EPINEPHRINE BITARTRATE 20; .01 MG/ML; MG/ML
1 INJECTION, SOLUTION SUBCUTANEOUS ONCE
Status: COMPLETED | OUTPATIENT
Start: 2025-05-23 | End: 2025-05-23

## 2025-05-23 RX ADMIN — LIDOCAINE HYDROCHLORIDE AND EPINEPHRINE BITARTRATE 10 ML: 20; .01 INJECTION, SOLUTION SUBCUTANEOUS at 14:56

## 2025-05-23 NOTE — PROGRESS NOTES
Cervical Procedure    Date/Time: 5/23/2025 2:15 PM    Performed by: Yardlie Toussaint-Foster, DO  Authorized by: Yardlie Toussaint-Foster, DO    Verbal consent obtained?: Yes    Written consent obtained?: Yes    Risks and benefits: Risks, benefits and alternatives were discussed    Consent given by:  Patient  Time Out:     Time out: Immediately prior to the procedure a time out was called      Time out performed at:  5/23/2025 2:00 PM  Patient states understanding of procedure being performed: Yes    Patient's understanding of procedure matches consent: Yes    Procedure consent matches procedure scheduled: Yes    Relevant documents present and verified: Yes    Test results available and properly labeled: Yes    Patient identity confirmed:  Verbally with patient and provided demographic data  Pre-procedure:     Premeds:  Diazepam (Prescribed prior to procedure)    Prepped with: acetic acid      Local anesthetic:  Lidocaine WITH epinephrine  Indication:     Indications: Persistent HPV HR positive.  Procedure:     Procedure: LEEP cone biopsy only      Electrocautery adjusted to number of krishna cutting in blend:  50    Electrocautery adjusted to coag in blend:  50    The grounding pad was placed: yes      Using the cutting current on the bovie pen the specimen was removed: yes      Approximate depth of the LEEP (mm):  20    Margin extension of the exocervix (mm):  12    Ball cautery with coag current: yes      Under satisfactory analgesia the patient was prepped and draped in the dorsal lithotomy position: yes      [place] speculum was placed in the vagina: yes      Intracervical block was performed: yes      Cervical tissue removed with Allis/ Ring Forceps: yes      Monsel's solution was applied: yes    Post-procedure:     Findings: White epithelium      Patient tolerance of procedure:  Tolerated well, no immediate complications

## 2025-05-28 LAB — SL AMB POCT URINE HCG: NEGATIVE

## 2025-06-03 PROCEDURE — 88305 TISSUE EXAM BY PATHOLOGIST: CPT | Performed by: PATHOLOGY

## 2025-06-03 PROCEDURE — 88344 IMHCHEM/IMCYTCHM EA MLT ANTB: CPT | Performed by: PATHOLOGY

## 2025-06-05 ENCOUNTER — TELEPHONE (OUTPATIENT)
Dept: OBGYN CLINIC | Facility: CLINIC | Age: 50
End: 2025-06-05

## 2025-06-05 DIAGNOSIS — N89.8 VAGINAL ITCHING: Primary | ICD-10-CM

## 2025-06-05 RX ORDER — METRONIDAZOLE 500 MG/1
500 TABLET ORAL EVERY 12 HOURS SCHEDULED
Qty: 14 TABLET | Refills: 0 | Status: SHIPPED | OUTPATIENT
Start: 2025-06-05 | End: 2025-06-12

## 2025-06-05 RX ORDER — FLUCONAZOLE 150 MG/1
150 TABLET ORAL ONCE
Qty: 1 TABLET | Refills: 0 | Status: SHIPPED | OUTPATIENT
Start: 2025-06-05 | End: 2025-06-05

## 2025-06-06 ENCOUNTER — OFFICE VISIT (OUTPATIENT)
Dept: DENTISTRY | Facility: CLINIC | Age: 50
End: 2025-06-06

## 2025-06-06 VITALS — HEART RATE: 85 BPM | DIASTOLIC BLOOD PRESSURE: 63 MMHG | SYSTOLIC BLOOD PRESSURE: 101 MMHG

## 2025-06-06 DIAGNOSIS — Z01.20 ENCOUNTER FOR DENTAL EXAMINATION: Primary | ICD-10-CM

## 2025-06-06 PROBLEM — L20.89 FLEXURAL ATOPIC DERMATITIS: Status: ACTIVE | Noted: 2020-02-11

## 2025-06-06 PROBLEM — E61.1 IRON DEFICIENCY: Status: ACTIVE | Noted: 2024-06-10

## 2025-06-06 PROCEDURE — D0210 INTRAORAL - COMPLETE SERIES OF RADIOGRAPHIC IMAGES: HCPCS | Performed by: DENTAL HYGIENIST

## 2025-06-06 PROCEDURE — D0150 COMPREHENSIVE ORAL EVALUATION - NEW OR ESTABLISHED PATIENT: HCPCS | Performed by: DENTIST

## 2025-06-06 RX ORDER — CYANOCOBALAMIN (VITAMIN B-12) 1000 MCG
TABLET ORAL DAILY
COMMUNITY
Start: 2025-05-31

## 2025-06-06 RX ORDER — FEXOFENADINE HCL 180 MG/1
TABLET ORAL DAILY
COMMUNITY
Start: 2025-05-05

## 2025-06-06 RX ORDER — FLUTICASONE PROPIONATE AND SALMETEROL 250; 50 UG/1; UG/1
POWDER RESPIRATORY (INHALATION)
COMMUNITY
Start: 2025-05-05

## 2025-06-06 RX ORDER — FLUCONAZOLE 150 MG/1
TABLET ORAL
COMMUNITY
Start: 2025-06-05

## 2025-06-06 RX ORDER — CALCIUM CARBONATE/VITAMIN D3 500MG-5MCG
TABLET ORAL DAILY
COMMUNITY
Start: 2025-05-05

## 2025-06-06 RX ORDER — FERROUS SULFATE 325(65) MG
TABLET ORAL
COMMUNITY
Start: 2025-05-29

## 2025-06-06 NOTE — PROGRESS NOTES
COMPREHENSIVE  EXAM, FMP , FMX   REVIEWED MED HX: meds, allergies, health changes reviewed in [facility]. All consents signed.  CHIEF CONCERN: no dental pain or concerns  PAIN SCALE:  0  ASA CLASS:  I  PLAQUE:  mild  CALCULUS:  light to mod  BLEEDING:   light  STAIN :   light      PERIO:  mild bone loss and recession  ---FMP 1-6 mm w/ BUP  ---Stage 1, Grade A  ---Prophy recommended    Hygiene Procedures:  none    Oral Hygiene Instruction: Brushing Minimum 2x daily for 2 minutes, daily flossing, Listerine, and Recommended soft toothbrush only    Dispensed: none    Visual and Tactile Intraoral/ Extraoral evaluation: Oral and Oropharyngeal cancer evaluation. No findings     Dr. Blanc   Reviewed with patient clinical and radiographic findings and patient verbalized understanding. All questions and concerns addressed.     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       TREATMENT  PLAN :   NV1:  Initial prophy - 50 min  NV2;  tx planning session - 60 min - need full time - needs translation    Next Recall: 6 month recall     Last BWX:   Last Panorex:  2/18/20  Last FMX :  6/6/25

## 2025-07-15 ENCOUNTER — OFFICE VISIT (OUTPATIENT)
Dept: OBGYN CLINIC | Facility: CLINIC | Age: 50
End: 2025-07-15

## 2025-07-15 VITALS — DIASTOLIC BLOOD PRESSURE: 64 MMHG | BODY MASS INDEX: 27.63 KG/M2 | WEIGHT: 156 LBS | SYSTOLIC BLOOD PRESSURE: 110 MMHG

## 2025-07-15 DIAGNOSIS — N87.0 DYSPLASIA OF CERVIX, LOW GRADE (CIN 1): ICD-10-CM

## 2025-07-15 DIAGNOSIS — Z09 POSTOPERATIVE EXAMINATION: Primary | ICD-10-CM

## 2025-07-15 DIAGNOSIS — N72 CHRONIC CERVICITIS: ICD-10-CM

## 2025-07-15 PROCEDURE — 99024 POSTOP FOLLOW-UP VISIT: CPT | Performed by: OBSTETRICS & GYNECOLOGY

## 2025-07-15 RX ORDER — DOXYCYCLINE 100 MG/1
100 TABLET ORAL 2 TIMES DAILY
Qty: 14 TABLET | Refills: 0 | Status: SHIPPED | OUTPATIENT
Start: 2025-07-15 | End: 2025-07-22